# Patient Record
Sex: FEMALE | Race: WHITE
[De-identification: names, ages, dates, MRNs, and addresses within clinical notes are randomized per-mention and may not be internally consistent; named-entity substitution may affect disease eponyms.]

---

## 2021-07-26 ENCOUNTER — HOSPITAL ENCOUNTER (INPATIENT)
Dept: HOSPITAL 47 - EC | Age: 71
LOS: 2 days | Discharge: HOME | DRG: 310 | End: 2021-07-28
Attending: INTERNAL MEDICINE | Admitting: INTERNAL MEDICINE
Payer: MEDICARE

## 2021-07-26 DIAGNOSIS — Z87.891: ICD-10-CM

## 2021-07-26 DIAGNOSIS — Z90.710: ICD-10-CM

## 2021-07-26 DIAGNOSIS — I48.3: ICD-10-CM

## 2021-07-26 DIAGNOSIS — I48.91: Primary | ICD-10-CM

## 2021-07-26 DIAGNOSIS — G89.29: ICD-10-CM

## 2021-07-26 DIAGNOSIS — Z79.01: ICD-10-CM

## 2021-07-26 DIAGNOSIS — Z82.5: ICD-10-CM

## 2021-07-26 DIAGNOSIS — Z79.890: ICD-10-CM

## 2021-07-26 DIAGNOSIS — I49.1: ICD-10-CM

## 2021-07-26 DIAGNOSIS — E03.9: ICD-10-CM

## 2021-07-26 DIAGNOSIS — Z80.1: ICD-10-CM

## 2021-07-26 DIAGNOSIS — Z79.899: ICD-10-CM

## 2021-07-26 DIAGNOSIS — Z82.49: ICD-10-CM

## 2021-07-26 DIAGNOSIS — I34.0: ICD-10-CM

## 2021-07-26 LAB
ALBUMIN SERPL-MCNC: 4.6 G/DL (ref 3.5–5)
ALP SERPL-CCNC: 120 U/L (ref 38–126)
ALT SERPL-CCNC: 18 U/L (ref 4–34)
ANION GAP SERPL CALC-SCNC: 7 MMOL/L
APTT BLD: 22.8 SEC (ref 22–30)
AST SERPL-CCNC: 27 U/L (ref 14–36)
BASOPHILS # BLD AUTO: 0.1 K/UL (ref 0–0.2)
BASOPHILS NFR BLD AUTO: 1 %
BUN SERPL-SCNC: 15 MG/DL (ref 7–17)
CALCIUM SPEC-MCNC: 9.8 MG/DL (ref 8.4–10.2)
CHLORIDE SERPL-SCNC: 110 MMOL/L (ref 98–107)
CO2 SERPL-SCNC: 26 MMOL/L (ref 22–30)
EOSINOPHIL # BLD AUTO: 0.2 K/UL (ref 0–0.7)
EOSINOPHIL NFR BLD AUTO: 2 %
ERYTHROCYTE [DISTWIDTH] IN BLOOD BY AUTOMATED COUNT: 5.08 M/UL (ref 3.8–5.4)
ERYTHROCYTE [DISTWIDTH] IN BLOOD: 14.3 % (ref 11.5–15.5)
GLUCOSE SERPL-MCNC: 107 MG/DL (ref 74–99)
HCT VFR BLD AUTO: 47 % (ref 34–46)
HGB BLD-MCNC: 15.2 GM/DL (ref 11.4–16)
INR PPP: 1 (ref ?–1.2)
LYMPHOCYTES # SPEC AUTO: 2.5 K/UL (ref 1–4.8)
LYMPHOCYTES NFR SPEC AUTO: 28 %
MAGNESIUM SPEC-SCNC: 2 MG/DL (ref 1.6–2.3)
MCH RBC QN AUTO: 30 PG (ref 25–35)
MCHC RBC AUTO-ENTMCNC: 32.4 G/DL (ref 31–37)
MCV RBC AUTO: 92.5 FL (ref 80–100)
MONOCYTES # BLD AUTO: 0.3 K/UL (ref 0–1)
MONOCYTES NFR BLD AUTO: 4 %
NEUTROPHILS # BLD AUTO: 5.6 K/UL (ref 1.3–7.7)
NEUTROPHILS NFR BLD AUTO: 64 %
PLATELET # BLD AUTO: 352 K/UL (ref 150–450)
POTASSIUM SERPL-SCNC: 4.2 MMOL/L (ref 3.5–5.1)
PROT SERPL-MCNC: 7.6 G/DL (ref 6.3–8.2)
PT BLD: 10.3 SEC (ref 9–12)
SODIUM SERPL-SCNC: 143 MMOL/L (ref 137–145)
T4 FREE SERPL-MCNC: 1.84 NG/DL (ref 0.78–2.19)
WBC # BLD AUTO: 8.7 K/UL (ref 3.8–10.6)

## 2021-07-26 PROCEDURE — 80048 BASIC METABOLIC PNL TOTAL CA: CPT

## 2021-07-26 PROCEDURE — 80053 COMPREHEN METABOLIC PANEL: CPT

## 2021-07-26 PROCEDURE — 71046 X-RAY EXAM CHEST 2 VIEWS: CPT

## 2021-07-26 PROCEDURE — 85027 COMPLETE CBC AUTOMATED: CPT

## 2021-07-26 PROCEDURE — 85730 THROMBOPLASTIN TIME PARTIAL: CPT

## 2021-07-26 PROCEDURE — 84443 ASSAY THYROID STIM HORMONE: CPT

## 2021-07-26 PROCEDURE — 84484 ASSAY OF TROPONIN QUANT: CPT

## 2021-07-26 PROCEDURE — 83735 ASSAY OF MAGNESIUM: CPT

## 2021-07-26 PROCEDURE — 85025 COMPLETE CBC W/AUTO DIFF WBC: CPT

## 2021-07-26 PROCEDURE — 93005 ELECTROCARDIOGRAM TRACING: CPT

## 2021-07-26 PROCEDURE — 96374 THER/PROPH/DIAG INJ IV PUSH: CPT

## 2021-07-26 PROCEDURE — 99291 CRITICAL CARE FIRST HOUR: CPT

## 2021-07-26 PROCEDURE — 85610 PROTHROMBIN TIME: CPT

## 2021-07-26 PROCEDURE — 84439 ASSAY OF FREE THYROXINE: CPT

## 2021-07-26 PROCEDURE — 36415 COLL VENOUS BLD VENIPUNCTURE: CPT

## 2021-07-26 PROCEDURE — 96375 TX/PRO/DX INJ NEW DRUG ADDON: CPT

## 2021-07-26 PROCEDURE — 93306 TTE W/DOPPLER COMPLETE: CPT

## 2021-07-26 RX ADMIN — DILTIAZEM HYDROCHLORIDE SCH MLS/HR: 5 INJECTION INTRAVENOUS at 20:59

## 2021-07-26 RX ADMIN — DILTIAZEM HYDROCHLORIDE SCH MLS/HR: 5 INJECTION INTRAVENOUS at 13:50

## 2021-07-26 RX ADMIN — HEPARIN SODIUM SCH MLS/HR: 10000 INJECTION, SOLUTION INTRAVENOUS at 09:58

## 2021-07-26 NOTE — P.HPIM
<Titus Siddiqi - Last Filed: 07/26/21 14:38>





History of Present Illness


H&P Date: 07/26/21





History of Presenting Illness:





Patient is a 70-year-old female with past medical history of hypothyroidism 

presented to the emergency department with a chief complaint of palpitations.  

Patient seen and fully evaluated at bedside in the emergency department and 

reports that she began experiencing intermittent episodes of palpitations 

accompanied by a tightness in her throat and dizziness on and off over the past 

few days.  Patient reports upon awakening today these palpitations were s

ignificantly worsened so she went to the urgent care center and was instructed 

to go straight to the emergency department.  She denies having any recent 

changes and her Synthroid dosage and denies having any other complaints 

including recent illness or exposure to known L contact, headache, changes in 

vision or hearing, changes in or difficulties with speech, chest pain, shortness

of breath, dyspnea with exertion, abdominal pain, nausea, vomiting, or 

experiencing any changes in her difficulties with urinary or bowel function, or 

experiencing any numbness/tingling/weakness/swelling in her extremities.  An EKG

was completed revealing atrial fibrillation with a rapid ventricular response at

157 bpm.  Labs were drawn with CBC, Coags, CMP, and troponin all unremarkable 

showing no abnormalities.  Patient was started on Cardizem infusion for rate 

control a along with anticoagulation for heparin admitted under our services 

with consultation to cardiology.  





Review of systems:





Pertinent positives and negatives as discussed in HPI, a complete review of 

systems was performed and all other systems are negative.





Physical exam:





Vital signs reviewed and stable. 


General: Nontoxic, no distress and appears stated age.  


Derm: Skin warm and dry, normal coloration for ethnicity.


Head: Atraumatic, normocephalic and symmetric.  


Eyes: EOMs intact, no lid lag, and anicteric sclera


Mouth: no lip lesions, mucus membranes moist


Cardiovascular: regular rate and rhythm with normal S1S2, no murmur, positive 

posterior tibial pulses bilaterally, and cap refill < 2 seconds.  


Lungs: Respirations even, regular, and unlabored on room air. Lungs CTA 

bilaterally, no rhonchi, no rales, no wheezing, and no accessory muscle usage. 


Abdominal: soft, nontender to palpation, no guarding, no appreciable 

organomegaly


Ext: ROM intact. No gross muscle atrophy, no edema, no contractures


Neuro: Speech clear, face symmetrical and CN II-XII grossly intact with no noted

focal neuro deficits


Psych: Alert and oriented to person, place, time, and situation. Appropriate and

pleasant affect. 





Assessment and Plan of Care:





Atrial fibrillation with RVR


-EKG was completed revealing atrial fibrillation with a rapid ventricular 

response at 157 bpm.


-Troponin<0.012, we will trend every 3 hours 2.


-Anticoagulation with heparin infusion, pharmacy to dose.  [id number] score 1.


-Continuation of Cardizem infusion titrating to maintain ventricular rate less 

than 110 bpm.


-Metoprolol 25 mg twice a day.


-Telemetry monitoring.


-Consult to cardiology, appreciate recommendations.


-Obtain TSH


-Echocardiogram.





Hypothyroidism


-Hold Synthroid pending TSH results





The patient is admitted with an anticipated greater than 2 midnight stay for 

evaluation of new onset atrial fibrillation with RVR


CODE STATUS: Full code


DVT prophylaxis: Heparin


Discussed with: Patient, patient has been, and RN


Anticipated discharge date: Clinical course to determine


Anticipated discharge place: Home 


A total of 45 minutes was spent on the care of this complex patient more than 

50% of the time was spent in counseling and care coordination.





Past Medical History


Past Medical History: Thyroid Disorder


History of Any Multi-Drug Resistant Organisms: None Reported


Past Surgical History: Hysterectomy, Orthopedic Surgery


Additional Past Surgical History / Comment(s): hip surgery


Past Psychological History: No Psychological Hx Reported


Smoking Status: Former smoker


Past Alcohol Use History: Occasional


Past Drug Use History: None Reported





Medications and Allergies


                                Home Medications











 Medication  Instructions  Recorded  Confirmed  Type


 


Levothyroxine Sodium 88 mcg PO DAILY 07/26/21 07/26/21 History








                                    Allergies











Allergy/AdvReac Type Severity Reaction Status Date / Time


 


No Known Allergies Allergy   Verified 07/26/21 09:28














Physical Exam


Vitals: 


                                   Vital Signs











  Temp Pulse Resp BP Pulse Ox


 


 07/26/21 12:09   115 H  18  140/75  99


 


 07/26/21 09:49   158 H  20  173/117  99


 


 07/26/21 09:24  98.4 F  169 H  16  160/92  97








                                Intake and Output











 07/25/21 07/26/21 07/26/21





 22:59 06:59 14:59


 


Intake Total   8.5


 


Balance   8.5


 


Intake:   


 


  Intake, IV Titration   8.5





  Amount   


 


    Diltiazem 125 mg In   8.5





    Sodium Chloride 0.9% 100   





    ml @ 5 MG/HR 5 mls/hr IV   





    .[facility] Rx#:[id number]   


 


Other:   


 


  Weight   79.379 kg














Results


CBC & Chem 7: 


                                 07/26/21 09:44





                                 07/26/21 09:44


Labs: 


                  Abnormal Lab Results - Last 24 Hours (Table)











  07/26/21 07/26/21 Range/Units





  09:44 09:44 


 


Hct   47.0 H  (34.0-46.0)  %


 


Chloride  110 H   ()  mmol/L


 


Glucose  107 H   (74-99)  mg/dL














<Evelyn Villaseñor - Last Filed: 07/26/21 19:43>





History of Present Illness


Patient seen and examined independently.  Patient was also seen by Titus Siddiqi NP and case was discussed.  I am in agreement with subjective, physical exam, 

assessment and plan as written above and amended below.





Excoriation can still feel her palpitations.  On assessment her heart rate was 

still in the 160s.  Nursing will increase Cardizem drip to 15.  Ativan oral dose

of metoprolol 25 mg.  Patient reports that she has had low heart rates after 

taking a blood pressure medication in the past.  We'll monitor closely on 

telemetry.





General: non toxic, no distress, appears at stated age


Derm: warm, dry


Head: atraumatic, normocephalic, symmetric


Eyes: EOMI, no lid lag, anicteric sclera


Mouth: no lip lesion, mucus membranes moist


Cardiovascular: [S1S2 irreg, no murmur, positive posterior tibial pulse 

bilateral, 


Lungs: CTA bilateral, no rhonchi, no rales , no accessory muscle use








Past Medical History





- Past Family History


  ** Father


Family Medical History: Cancer


Additional Family Medical History / Comment(s): lung cancer, ETOH





  ** Mother


Family Medical History: COPD





  ** Sister(s)


Family Medical History: Cancer, Hypertension





Physical Exam


Osteopathic Statement: *.  No significant issues noted on an osteopathic 

structural exam other than those noted in the History and Physical/Consult.


Vitals: 


                                   Vital Signs











  Temp Pulse Pulse Resp BP BP Pulse Ox


 


 07/26/21 15:45  98.2 F   70  16   158/72  98


 


 07/26/21 13:54   100   18  103/78   96


 


 07/26/21 12:09   115 H   18  140/75   99


 


 07/26/21 09:49   158 H   20  173/117   99


 


 07/26/21 09:24  98.4 F  169 H   16  160/92   97








                                Intake and Output











 07/26/21 07/26/21 07/26/21





 06:59 14:59 22:59


 


Intake Total  29.667 540


 


Balance  29.667 540


 


Intake:   


 


  Intake, IV Titration  29.667 





  Amount   


 


    Diltiazem 125 mg In  29.667 





    Sodium Chloride 0.9% 100   





    ml @ 5 MG/HR 5 mls/hr IV   





    .[facility]24[facility] Rx#:[id number]   


 


  Oral   540


 


Other:   


 


  # Voids   1


 


  Weight  79.379 kg 79.379 kg














Results


CBC & Chem 7: 


                                 07/26/21 09:44





                                 07/26/21 09:44


Labs: 


                  Abnormal Lab Results - Last 24 Hours (Table)











  07/26/21 07/26/21 07/26/21 Range/Units





  09:44 09:44 09:44 


 


Hct   47.0 H   (34.0-46.0)  %


 


APTT     (22.0-30.0)  sec


 


Chloride  110 H    ()  mmol/L


 


Glucose  107 H    (74-99)  mg/dL


 


TSH    0.221 L  (0.465-4.680)  mIU/L














  07/26/21 Range/Units





  17:31 


 


Hct   (34.0-46.0)  %


 


APTT  44.7 H  (22.0-30.0)  sec


 


Chloride   ()  mmol/L


 


Glucose   (74-99)  mg/dL


 


TSH   (0.465-4.680)  mIU/L

## 2021-07-26 NOTE — XR
EXAMINATION TYPE: XR chest 2V

 

DATE OF EXAM: 7/26/2021

 

COMPARISON: None

 

HISTORY: 70-year-old female elevated heart rate, dysrhythmia

 

TECHNIQUE:  PA and lateral views

 

FINDINGS:  

The cardiomediastinal silhouette, aorta, and pulmonary vasculature are within normal limits. Hazy low
er lung opacities related to overlying soft tissue. Otherwise, lungs and pleural spaces are clear.

 

 

IMPRESSION:  

No acute cardiopulmonary process.

## 2021-07-26 NOTE — ED
Arrhythmia/Palpitations HPI





- General


Source: patient, RN notes reviewed


Mode of arrival: wheelchair


Limitations: no limitations





<Adryan Flower - Last Filed: 07/26/21 11:34>





<Orlando Baxter - Last Filed: 07/26/21 13:21>





- General


Chief Complaint: Arrhythmia/Palpitations


Stated Complaint: palpitations


Time Seen by Provider: 07/26/21 09:33





- History of Present Illness


Initial Comments: 





This a 70-year-old female presents emergency from chief complaint of 

palpitations.  Patient states that she has not felt well the last few days.  

Patient went to [facility] and told she needs go the emergency department.  

Patient states she has no chest pains time she occasionally has some shortness 

of breath.  Patient states that she has no prior cardiac disease denies any 

blood thinners denies any leg pain leg swelling.  Patient does have a history of

hypothyroidism.  Patient's had no medication changes recently has been taking 

her doses. (Adryan Flower)





- Related Data


                                Home Medications











 Medication  Instructions  Recorded  Confirmed


 


Levothyroxine Sodium 88 mcg PO DAILY 07/26/21 07/26/21











                                    Allergies











Allergy/AdvReac Type Severity Reaction Status Date / Time


 


No Known Allergies Allergy   Verified 07/26/21 09:28














Review of Systems


ROS Other: All systems not noted in ROS Statement are negative.





<Adryan Flower - Last Filed: 07/26/21 11:34>


ROS Other: All systems not noted in ROS Statement are negative.





<Orlando Baxter - Last Filed: 07/26/21 13:21>


ROS Statement: 


Those systems with pertinent positive or pertinent negative responses have been 

documented in the HPI.








Past Medical History


Past Medical History: Thyroid Disorder


History of Any Multi-Drug Resistant Organisms: None Reported


Past Surgical History: Hysterectomy, Orthopedic Surgery


Additional Past Surgical History / Comment(s): hip surgery


Past Psychological History: No Psychological Hx Reported


Smoking Status: Former smoker


Past Alcohol Use History: Occasional


Past Drug Use History: None Reported





<Adryan Flower - Last Filed: 07/26/21 11:34>





General Exam


Limitations: no limitations


General appearance: alert, in no apparent distress


Head exam: Present: atraumatic, normocephalic, normal inspection


Eye exam: Present: normal appearance, PERRL, EOMI.  Absent: scleral icterus, 

conjunctival injection, periorbital swelling


ENT exam: Present: normal exam, mucous membranes moist


Neck exam: Present: normal inspection.  Absent: tenderness, meningismus, 

lymphadenopathy


Respiratory exam: Present: normal lung sounds bilaterally.  Absent: respiratory 

distress, wheezes, rales, rhonchi, stridor


Cardiovascular Exam: Present: tachycardia, irregular rhythm, normal heart 

sounds.  Absent: regular rate, normal rhythm, systolic murmur, diastolic murmur,

rubs, gallop, clicks


GI/Abdominal exam: Present: soft, normal bowel sounds.  Absent: distended, 

tenderness, guarding, rebound, rigid





<Adryan Flower - Last Filed: 07/26/21 11:34>





Course





<Orlando Baxter - Last Filed: 07/26/21 13:21>


                                   Vital Signs











  07/26/21 07/26/21 07/26/21





  09:24 09:49 12:09


 


Temperature 98.4 F  


 


Pulse Rate 169 H 158 H 115 H


 


Respiratory 16 20 18





Rate   


 


Blood Pressure 160/92 173/117 140/75


 


O2 Sat by Pulse 97 99 99





Oximetry   














- Reevaluation(s)


Reevaluation #1: 





07/26/21 13:20


I did personally evaluate the patient and care I do agree with assessment and 

plan patient comes in with findings consistent with A. flutter


 RVR.  He admitted


07/26/21 13:21


 (Orlando Baxter)





EKG Findings





- EKG Comments:


EKG Findings:: :30 for atrial flutter with 2-1 rate of 157 QRS 82 QT/QTc 

to 232/375





<Adryan Flower - Last Filed: 07/26/21 11:34>





Medical Decision Making





- Lab Data


Result diagrams: 


                                 07/26/21 09:44





                                 07/26/21 09:44





<Adryan Flower - Last Filed: 07/26/21 11:34>





- Lab Data


Result diagrams: 


                                 07/26/21 09:44





                                 07/26/21 09:44





<Orlando Baxter - Last Filed: 07/26/21 13:21>





- Medical Decision Making





70-year-old female presented for palpitations.  Patient found to be in a 

flutter, A. fib.  Patient has no history.  Patient started on Cardizem, heparin.

 Patient lives otherwise unremarkable be admitted for cardiology evaluation. 

(Adryan Flower)





- Lab Data


                                   Lab Results











  07/26/21 07/26/21 07/26/21 Range/Units





  09:44 09:44 09:44 


 


WBC     (3.8-10.6)  k/uL


 


RBC     (3.80-5.40)  m/uL


 


Hgb     (11.4-16.0)  gm/dL


 


Hct     (34.0-46.0)  %


 


MCV     (80.0-100.0)  fL


 


MCH     (25.0-35.0)  pg


 


MCHC     (31.0-37.0)  g/dL


 


RDW     (11.5-15.5)  %


 


Plt Count     (150-450)  k/uL


 


MPV     


 


Neutrophils %     %


 


Lymphocytes %     %


 


Monocytes %     %


 


Eosinophils %     %


 


Basophils %     %


 


Neutrophils #     (1.3-7.7)  k/uL


 


Lymphocytes #     (1.0-4.8)  k/uL


 


Monocytes #     (0-1.0)  k/uL


 


Eosinophils #     (0-0.7)  k/uL


 


Basophils #     (0-0.2)  k/uL


 


PT  10.3    (9.0-12.0)  sec


 


INR  1.0    (<1.2)  


 


APTT  22.8    (22.0-30.0)  sec


 


Sodium   143   (137-145)  mmol/L


 


Potassium   4.2   (3.5-5.1)  mmol/L


 


Chloride   110 H   ()  mmol/L


 


Carbon Dioxide   26   (22-30)  mmol/L


 


Anion Gap   7   mmol/L


 


BUN   15   (7-17)  mg/dL


 


Creatinine   0.86   (0.52-1.04)  mg/dL


 


Est GFR (CKD-EPI)AfAm   80   (>60 ml/min/1.73 sqM)  


 


Est GFR (CKD-EPI)NonAf   70   (>60 ml/min/1.73 sqM)  


 


Glucose   107 H   (74-99)  mg/dL


 


Calcium   9.8   (8.4-10.2)  mg/dL


 


Magnesium   2.0   (1.6-2.3)  mg/dL


 


Total Bilirubin   0.5   (0.2-1.3)  mg/dL


 


AST   27   (14-36)  U/L


 


ALT   18   (4-34)  U/L


 


Alkaline Phosphatase   120   ()  U/L


 


Troponin I    <0.012  (0.000-0.034)  ng/mL


 


Total Protein   7.6   (6.3-8.2)  g/dL


 


Albumin   4.6   (3.5-5.0)  g/dL














  07/26/21 Range/Units





  09:44 


 


WBC  8.7  (3.8-10.6)  k/uL


 


RBC  5.08  (3.80-5.40)  m/uL


 


Hgb  15.2  (11.4-16.0)  gm/dL


 


Hct  47.0 H  (34.0-46.0)  %


 


MCV  92.5  (80.0-100.0)  fL


 


MCH  30.0  (25.0-35.0)  pg


 


MCHC  32.4  (31.0-37.0)  g/dL


 


RDW  14.3  (11.5-15.5)  %


 


Plt Count  352  (150-450)  k/uL


 


MPV  7.8  


 


Neutrophils %  64  %


 


Lymphocytes %  28  %


 


Monocytes %  4  %


 


Eosinophils %  2  %


 


Basophils %  1  %


 


Neutrophils #  5.6  (1.3-7.7)  k/uL


 


Lymphocytes #  2.5  (1.0-4.8)  k/uL


 


Monocytes #  0.3  (0-1.0)  k/uL


 


Eosinophils #  0.2  (0-0.7)  k/uL


 


Basophils #  0.1  (0-0.2)  k/uL


 


PT   (9.0-12.0)  sec


 


INR   (<1.2)  


 


APTT   (22.0-30.0)  sec


 


Sodium   (137-145)  mmol/L


 


Potassium   (3.5-5.1)  mmol/L


 


Chloride   ()  mmol/L


 


Carbon Dioxide   (22-30)  mmol/L


 


Anion Gap   mmol/L


 


BUN   (7-17)  mg/dL


 


Creatinine   (0.52-1.04)  mg/dL


 


Est GFR (CKD-EPI)AfAm   (>60 ml/min/1.73 sqM)  


 


Est GFR (CKD-EPI)NonAf   (>60 ml/min/1.73 sqM)  


 


Glucose   (74-99)  mg/dL


 


Calcium   (8.4-10.2)  mg/dL


 


Magnesium   (1.6-2.3)  mg/dL


 


Total Bilirubin   (0.2-1.3)  mg/dL


 


AST   (14-36)  U/L


 


ALT   (4-34)  U/L


 


Alkaline Phosphatase   ()  U/L


 


Troponin I   (0.000-0.034)  ng/mL


 


Total Protein   (6.3-8.2)  g/dL


 


Albumin   (3.5-5.0)  g/dL














Critical Care Time


Critical Care Time: Yes


Total Critical Care Time: 35





<Adryan Flower - Last Filed: 07/26/21 11:34>





Disposition





<Adryan Flower - Last Filed: 07/26/21 11:34>





<Orlando Baxter - Last Filed: 07/26/21 13:21>


Clinical Impression: 


 Atrial flutter with rapid ventricular response





Disposition: ADMITTED AS IP TO THIS HOSP


Condition: Fair

## 2021-07-27 VITALS — RESPIRATION RATE: 16 BRPM

## 2021-07-27 RX ADMIN — FLECAINIDE ACETATE SCH MG: 50 TABLET ORAL at 10:45

## 2021-07-27 RX ADMIN — DILTIAZEM HYDROCHLORIDE SCH: 5 INJECTION INTRAVENOUS at 08:23

## 2021-07-27 RX ADMIN — METOPROLOL TARTRATE SCH MG: 50 TABLET, FILM COATED ORAL at 08:42

## 2021-07-27 RX ADMIN — APIXABAN SCH MG: 5 TABLET, FILM COATED ORAL at 20:38

## 2021-07-27 RX ADMIN — METOPROLOL TARTRATE SCH MG: 50 TABLET, FILM COATED ORAL at 20:38

## 2021-07-27 RX ADMIN — HEPARIN SODIUM SCH MLS/HR: 10000 INJECTION, SOLUTION INTRAVENOUS at 08:52

## 2021-07-27 RX ADMIN — APIXABAN SCH MG: 5 TABLET, FILM COATED ORAL at 11:53

## 2021-07-27 NOTE — ECHOF
Referral Reason:New-onset atrial fibrillation with RVR



MEASUREMENTS

--------

HEIGHT: 160.0 cm

WEIGHT: 78.9 kg

BP: 127/73

RVIDd:   2.6 cm     (< 3.3)

IVSd:   1.1 cm     (0.6 - 1.1)

LVIDd:   3.4 cm     (3.9 - 5.3)

LVPWd:   1.3 cm     (0.6 - 1.1)

IVSs:   2.1 cm

LVIDs:   1.9 cm

LVPWs:   2.1 cm

LAESV Index (A-L):   28.41 ml/m

Ao Diam:   2.6 cm     (2.0 - 3.7)

AV Cusp:   1.8 cm     (1.5 - 2.6)

LA Diam:   3.1 cm     (2.7 - 3.8)

MV EXCURSION:   19.783 mm     (> 18.000)

MV EF SLOPE:   165 mm/s     (70 - 150)

EPSS:   0.9 cm

AR PHT:   341 ms

RAP:   5.00 mmHg

RVSP:   20.46 mmHg







FINDINGS

--------

Atrial fibrillation.

This was a technically good study.

The left ventricular size is normal.   There is mild concentric left ventricular hypertrophy.   Overa
ll left ventricular systolic function is normal with, an EF between 55 - 60 %.

The right ventricle is normal in size.

The left atrial size is normal.    Normal LA  size by volume 22+/-6 ml/m2.

The right atrial size is normal.

The aortic valve is trileaflet and appears structurally normal.   Trace amount of aortic regurgitatio
n.

The mitral valve is normal.   Mild mitral regurgitation is present.

The tricuspid valve appears structurally normal.   Trace tricuspid regurgitation present.   Right dulce
tricular systolic pressure is normal at < 35 mmHg.

There is no pulmonic regurgitation present.

The aortic root size is normal.

Normal inferior vena cava with normal inspiratory collapse consistent with estimated right atrial pre
ssure of  5 mmHg.

There is no pericardial effusion.



CONCLUSIONS

--------

1. The left ventricular size is normal.

2. There is mild concentric left ventricular hypertrophy.

3. Overall left ventricular systolic function is normal with, an EF between 55 - 60 %.

4. Trace amount of aortic regurgitation.

5. Mild mitral regurgitation is present.

6. Trace tricuspid regurgitation present.

7. There is no pericardial effusion.





SONOGRAPHER: Susana Lobo RDCS

## 2021-07-27 NOTE — P.PN
<Titus Siddiqi - Last Filed: 07/27/21 11:03>





Subjective


Progress Note Date: 07/27/21





History of Presenting Illness:





Patient is a 70-year-old female with past medical history of hypothyroidism 

presented to the emergency department with a chief complaint of palpitations.  

Patient seen and fully evaluated at bedside in the emergency department and 

reports that she began experiencing intermittent episodes of palpitations 

accompanied by a tightness in her throat and dizziness on and off over the past 

few days.  Patient reports upon awakening today these palpitations were signifi

cantly worsened so she went to the urgent care center and was instructed to go 

straight to the emergency department.  She denies having any recent changes and 

her Synthroid dosage and denies having any other complaints including recent 

illness or exposure to known L contact, headache, changes in vision or hearing, 

changes in or difficulties with speech, chest pain, shortness of breath, dyspnea

with exertion, abdominal pain, nausea, vomiting, or experiencing any changes in 

her difficulties with urinary or bowel function, or experiencing any 

numbness/tingling/weakness/swelling in her extremities.  An EKG was completed 

revealing atrial fibrillation with a rapid ventricular response at 157 bpm.  

Labs were drawn with CBC, Coags, CMP, and troponin all unremarkable showing no 

abnormalities.  Patient was started on Cardizem infusion for rate control a 

along with anticoagulation for heparin admitted under our services with 

consultation to cardiology.  Cardizem infusion was discontinued and pt started 

on Flecainide by cardiology. 





Physical exam:





Pt seen and fully evaluated at the bedside this morning.  She reports she 

continues to have palpitations and episodes of lightheadedness.  She denies 

having any headache, changes in vision, changes in hearing, changes in or 

difficulties with her speech, dysphasia, chest pain, shortness of breath, or exp

eriencing any numbness/tingling/weakness/swelling in her extremities.  Patient 

remains in A. fib with rate fluctuating between 90s and 110s at time of 

assessment.  TSH was low at 0.221 however free T4 was normal at 1.84.  She 

currently remains on anticoagulation with heparin infusion, Eliquis prescription

has been sent to pharmacy to verify insurance coverage and patient will be 

changed over to oral anticoagulation once insurance verification on which oral 

anticoagulant is covered can be completed.  Cardizem infusion discontinued and 

patient started on flecainide and Toprol dose increased to 50 mg twice daily.





Vital signs reviewed and stable. 


General: Nontoxic, no distress and appears stated age.  


Derm: Skin warm and dry, normal coloration for ethnicity.


Head: Atraumatic, normocephalic and symmetric.  


Eyes: EOMs intact, no lid lag, and anicteric sclera


Mouth: no lip lesions, mucus membranes moist


Cardiovascular: IRREGULARLY IRREGULAR, no murmur, positive posterior tibial 

pulses bilaterally, and cap refill < 2 seconds.  


Lungs: Respirations even, regular, and unlabored on room air. Lungs CTA 

bilaterally, no rhonchi, no rales, no wheezing, and no accessory muscle usage. 


Abdominal: soft, nontender to palpation, no guarding, no appreciable 

organomegaly


Ext: ROM intact. No gross muscle atrophy, no edema, no contractures


Neuro: Speech clear, face symmetrical and CN II-XII grossly intact with no noted

focal neuro deficits


Psych: Alert and oriented to person, place, time, and situation. Appropriate and

pleasant affect. 





Assessment and Plan of Care:





Atrial fibrillation with RVR


-EKG was completed revealing atrial fibrillation with a rapid ventricular 

response at 157 bpm.


-Troponin<0.012, we will trend every 3 hours 2.


-Anticoagulation with heparin infusion, pharmacy to dose.  [id number] score 1.


-Cardizem infusion was discontinued and pt started on Flecainide by cardiology. 

Currently pt remains in a-fib with rate at time of assessment fluctuating 

between 90's-110's. 


-Metoprolol increased to 50 mg twice a day.


-Telemetry monitoring.


-Cardiology following, appreciate further recommendations.


-TSH slightly low at 0.221, however free T4 was normal at 1.84.  Synthroid dose 

decreased to 50 g daily.


-Echocardiogram.





Hypothyroidism


-TSH slightly low at 0.221, however free T4 was normal at 1.84.  


-Synthroid dose decreased to 50 g daily.


-Patient to be educated upon discharge that she will need to follow up with her 

PCP/endocrinologist for repeat testing of thyroid function in approximately 6-8 

weeks.








CODE STATUS: Full code


DVT prophylaxis: Heparin


Discussed with: Patient, patient has been, and RN


Anticipated discharge date: Clinical course to determine


Anticipated discharge place: Home 


A total of 45 minutes was spent on the care of this complex patient more than 

50% of the time was spent in counseling and care coordination.














Objective





- Vital Signs


Vital signs: 


                                   Vital Signs











Temp  98.2 F   07/27/21 08:00


 


Pulse  80   07/27/21 08:00


 


Resp  16   07/27/21 08:00


 


BP  134/77   07/27/21 08:00


 


Pulse Ox  96   07/27/21 08:00








                                 Intake & Output











 07/26/21 07/27/21 07/27/21





 18:59 06:59 18:59


 


Intake Total 569.667 107.25 399.081


 


Balance 569.667 107.25 399.081


 


Weight 79.379 kg 79.2 kg 


 


Intake:   


 


  Intake, IV Titration 29.667 107.25 399.081





  Amount   


 


    Diltiazem 125 mg In  107.25 85.625





    Sodium Chloride 0.9% 100   





    ml @ 15 MG/HR 15 mls/hr   





    IV .Q8H20M [facility] Rx#:   





    [id number]   


 


    Diltiazem 125 mg In 29.667  95.333





    Sodium Chloride 0.9% 100   





    ml @ 5 MG/HR 5 mls/hr IV   





    .Q24H [facility] Rx#:[id number]   


 


    Heparin Sod,Pork in 0.45%   218.123





    NaCl 25,000 unit In 0.45   





    % NaCl 1 250ml.bag @ 12   





    UNITS/KG/HR 9.525 mls/hr   





    IV .Q24H [facility] Rx#:   





    [id number]   


 


  Oral 540  


 


Other:   


 


  Voiding Method   Toilet


 


  # Voids 1 1 














- Labs


CBC & Chem 7: 


                                 07/26/21 09:44





                                 07/26/21 09:44


Labs: 


                  Abnormal Lab Results - Last 24 Hours (Table)











  07/26/21 07/26/21 07/26/21 Range/Units





  09:44 09:44 09:44 


 


Hct   47.0 H   (34.0-46.0)  %


 


APTT     (22.0-30.0)  sec


 


Chloride  110 H    ()  mmol/L


 


Glucose  107 H    (74-99)  mg/dL


 


TSH    0.221 L  (0.465-4.680)  mIU/L














  07/26/21 Range/Units





  17:31 


 


Hct   (34.0-46.0)  %


 


APTT  44.7 H  (22.0-30.0)  sec


 


Chloride   ()  mmol/L


 


Glucose   (74-99)  mg/dL


 


TSH   (0.465-4.680)  mIU/L














<Evelyn Villaseñor A - Last Filed: 07/27/21 19:40>





Subjective


Titus Siddiqi NP rendered care for this patient independently, reviewed the f

indings and plan as documented in the note above.  I did not physically speak 

with or examine the patient on this date. 








Objective





- Vital Signs


Vital signs: 


                                   Vital Signs











Temp  98 F   07/27/21 16:00


 


Pulse  67   07/27/21 12:00


 


Resp  16   07/27/21 16:00


 


BP  140/64   07/27/21 16:00


 


Pulse Ox  98   07/27/21 16:00








                                 Intake & Output











 07/27/21 07/27/21 07/28/21





 06:59 18:59 06:59


 


Intake Total 107.25 667.021 


 


Balance 107.25 667.021 


 


Weight 79.2 kg  


 


Intake:   


 


  Intake, IV Titration 107.25 427.021 





  Amount   


 


    Diltiazem 125 mg In  95.333 





    Sodium Chloride 0.9% 100   





    ml @ 5 MG/HR 5 mls/hr IV   





    .Q24H [facility] Rx#:[id number]   


 


    Diltiazem 125 mg In 107.25 85.625 





    Sodium Chloride 0.9% 100   





    ml @ 5 MG/HR 5 mls/hr IV   





    .Q24H [facility] Rx#:[id number]   


 


    Heparin Sod,Pork in 0.45%  246.063 





    NaCl 25,000 unit In 0.45   





    % NaCl 1 250ml.bag @ 12   





    UNITS/KG/HR 9.525 mls/hr   





    IV .Q24H [facility] Rx#:   





    [id number]   


 


  Oral  240 


 


Other:   


 


  Voiding Method  Toilet 


 


  # Voids 1 2 














- Labs


CBC & Chem 7: 


                                 07/26/21 09:44





                                 07/26/21 09:44

## 2021-07-27 NOTE — P.CRDCN
History of Present Illness


History of present illness: 


HISTORY OF PRESENTING ILLNESS


This is a pleasant 70-year-old female past medical history significant for 

hypothyroidism. She states she has seen a cardiologist before in [place]. 

She is from [place] and is here on vacation for 2 weeks.  We have been 

asked to see in consultation for new onset atrial flutter. Patient presented to 

the emergency department with a chief complaint of palpitations. She states that

on Friday she started to not "feel well". She denies specific nausea, vomiting, 

abdominal pain. Since Friday she has been experiencing worsening intermittent 

episodes of palpitations, tightness in throat, lightheadness over the past few 

days. Yesterday, her palpitations were significantly worsened so she first 

presented to an urgent care  and was instructed to go to the emergency 

department.  She denies having any recent changes and her Synthroid dosage. She 

denies chest pain, shortness of breath, recent illness or exposure, never had 

covid-19 infection, dyspnea on exertion. She does state she has chronic 

abdominal pain, some nausea and diarrhea and has worked up as an outpatient 

before and currently takes Metamucil and Immodium. She states in the past year 

she has seen a cardiologist in [place], she has been told she has 

occasional extra beats, she also had an echocardiogram and was told it was 

normal and that she has mild mitral regurgitation, she has had stress tests in 

the past which she was told were normal. She denies history of hypertension, 

diabetes, stroke, MI, hyperlipidemia. She denies family history of heart 

disease. She occasionally drinks alcohol about 3 glasses a wine a week and 

denies tobacco use. EKG on admission revealed atrial flutter 2:1 conduction HR 

150s. Patient received 125 mg Cardizem bolus and started on a Cardizem drip at 

15mg/hr weaned to 7.5mg/hr this morning. Patient converted back to sinus rhythm 

with premature atrial complexes currently HR 60-90s. Current home medications 

include synthroid 88mcg. Chest xray no acute cardiopulmonary process Laboratory 

reviewed, CBC unremarkable, sodium 143, potassium 4.2, BUN 15, serum creatinine 

0.8, troponin negative 1, TSH 0.2, free T4 1 0.8





REVIEW OF SYSTEMS


At the time of my exam:


CONSTITUTIONAL: Denies fever or chills.


CARDIOVASCULAR: +palpitations, Denies chest pain, shortness of breath, 

orthopnea, PND 


RESPIRATORY: Denies cough. 


GASTROINTESTINAL: Denies abdominal pain, diarrhea, constipation, nausea or 

vomiting.


MUSCULOSKELETAL: Denies myalgias.


NEUROLOGIC: +dizziness, +lightheadedess. Denies numbness, tingling, headacbe or 

weakness.


ENDOCRINE: Denies fatigue, weight change,  polydipsia or polyurina.


GENITOURINARY: Denies burning, hematuria or urgency with micturation.


HEMATOLOGIC: Denies history of anemia or bleeding. 





PHYSICAL EXAMINATION


CONSTITUTIONAL: No apparent distress. 


HEENT: Head is normocephalic. Pupils are equal, round. Sclerae anicteric. Mucous

membranes of the mouth are moist.  No JVD. No carotid bruit.


CHEST EXAMINATION: Lungs are clear to auscultation. No chest wall tenderness is 

noted on palpation or with deep breathing. 


HEART EXAMINATION: Regular rate and rhythm. S1, S2 heard. No murmurs, gallops or

rub.


ABDOMEN: Soft, nontender. Positive bowel sounds.


EXTREMITIES: 2+ peripheral pulses, no lower extremity edema and no calf 

tenderness.


SKIN: intact


NEUROLOGIC EXAMINATION: Patient is awake, alert and oriented x3. 





ASSESSMENT


Typical Atrial Flutter, new onset -[id number] score 2


Hypothyroidism





PLAN


-Obtain 2D echocardiogram- which revealed EF 55-60%, mild mitral regurgitation


-Repeat EKG this morning completed and reviewed 


-For Rate Control metoprolol tartrate 50mg BID and start flecainide 50mg BID 


-Anticoagulation start Eliquis 5mg BID, Case management consulted, patient does 

have a free month of Eliquis. Case management unable to price Eliquis due to 

insurance at this time, patient is calling in regards to coverage. 


-From a cardiology perspective, if patient feeling well and stable, ok to 

discharge later today. Patient will be in town for about 1 more week. Recommend 

follow up with Dr. Mane prior to going back to [place]. Patient states 

she does have a cardiologist in [place] that she can follow up with. 





Nurse Practitioner note has been reviewed, I agree with a documented findings 

and plan of care.  Patient was seen and examined.











Past Medical History


Past Medical History: Thyroid Disorder


Additional Past Medical History / Comment(s): mitral valve regurgitation


History of Any Multi-Drug Resistant Organisms: None Reported


Past Surgical History: Hysterectomy, Orthopedic Surgery


Additional Past Surgical History / Comment(s): hip surgery


Past Anesthesia/Blood Transfusion Reactions: Previous Problems w/ Anesthesia


Additional Past Anesthesia/Blood Transfusion Reaction / Comment(s): hard to wake

up


Past Psychological History: No Psychological Hx Reported


Smoking Status: Never smoker


Past Alcohol Use History: Occasional


Past Drug Use History: None Reported





- Past Family History


  ** Father


Family Medical History: Cancer


Additional Family Medical History / Comment(s): lung cancer, ETOH





  ** Mother


Family Medical History: COPD





  ** Sister(s)


Family Medical History: Cancer, Hypertension





Medications and Allergies


                                Home Medications











 Medication  Instructions  Recorded  Confirmed  Type


 


Apixaban [Eliquis] 5 mg PO BID 90 Days #180 tab 07/27/21  Rx


 


Flecainide [Tambocor] 50 mg PO Q12HR 90 Days #180 tab 07/27/21  Rx


 


Levothyroxine Sodium [Synthroid] 50 mcg PO DAILY@0630 60 Days #30 07/27/21  Rx





 tab   


 


Metoprolol Tartrate [Lopressor] 50 mg PO BID 90 Days #180 tab 07/27/21  Rx








                                    Allergies











Allergy/AdvReac Type Severity Reaction Status Date / Time


 


No Known Allergies Allergy   Verified 07/26/21 09:28














Physical Exam


Vitals: 


                                   Vital Signs











  Temp Pulse Pulse Resp BP BP Pulse Ox


 


 07/27/21 04:00    92  16   127/73  98


 


 07/27/21 00:00  97.9 F   69  18   121/75  98


 


 07/26/21 20:00  98.2 F   96  16   145/86  98


 


 07/26/21 15:45  98.2 F   70  16   158/72  98


 


 07/26/21 13:54   100   18  103/78   96


 


 07/26/21 12:09   115 H   18  140/75   99


 


 07/26/21 09:49   158 H   20  173/117   99


 


 07/26/21 09:24  98.4 F  169 H   16  160/92   97








                                Intake and Output











 07/26/21 07/26/21 07/27/21





 14:59 22:59 06:59


 


Intake Total 29.667 647.25 


 


Balance 29.667 647.25 


 


Intake:   


 


  Intake, IV Titration 29.667 107.25 





  Amount   


 


    Diltiazem 125 mg In  107.25 





    Sodium Chloride 0.9% 100   





    ml @ 15 MG/HR 15 mls/hr   





    IV .Q8H20M [facility] Rx#:   





    [id number]   


 


    Diltiazem 125 mg In 29.667  





    Sodium Chloride 0.9% 100   





    ml @ 5 MG/HR 5 mls/hr IV   





    .Q24H [facility] Rx#:[id number]   


 


  Oral  540 


 


Other:   


 


  # Voids  1 1


 


  Weight 79.379 kg 79.379 kg 79.2 kg














Results





                                 07/26/21 09:44





                                 07/26/21 09:44


                                 Cardiac Enzymes











  07/26/21 07/26/21 Range/Units





  09:44 09:44 


 


AST  27   (14-36)  U/L


 


Troponin I   <0.012  (0.000-0.034)  ng/mL








                                   Coagulation











  07/26/21 07/26/21 Range/Units





  09:44 17:31 


 


PT  10.3   (9.0-12.0)  sec


 


APTT  22.8  44.7 H  (22.0-30.0)  sec








                                       CBC











  07/26/21 Range/Units





  09:44 


 


WBC  8.7  (3.8-10.6)  k/uL


 


RBC  5.08  (3.80-5.40)  m/uL


 


Hgb  15.2  (11.4-16.0)  gm/dL


 


Hct  47.0 H  (34.0-46.0)  %


 


Plt Count  352  (150-450)  k/uL








                          Comprehensive Metabolic Panel











  07/26/21 Range/Units





  09:44 


 


Sodium  143  (137-145)  mmol/L


 


Potassium  4.2  (3.5-5.1)  mmol/L


 


Chloride  110 H  ()  mmol/L


 


Carbon Dioxide  26  (22-30)  mmol/L


 


BUN  15  (7-17)  mg/dL


 


Creatinine  0.86  (0.52-1.04)  mg/dL


 


Glucose  107 H  (74-99)  mg/dL


 


Calcium  9.8  (8.4-10.2)  mg/dL


 


AST  27  (14-36)  U/L


 


ALT  18  (4-34)  U/L


 


Alkaline Phosphatase  120  ()  U/L


 


Total Protein  7.6  (6.3-8.2)  g/dL


 


Albumin  4.6  (3.5-5.0)  g/dL








                               Current Medications











Generic Name Dose Route Start Last Admin





  Trade Name Freq  PRN Reason Stop Dose Admin


 


Acetaminophen  650 mg  07/26/21 11:35  07/26/21 17:43





  Acetaminophen Tab 325 Mg Tab  PO   650 mg





  Q6HR PRN   Administration





  Mild Pain or Fever > 100.5  


 


Hydrocodone Bitart/Acetaminophen  1 each  07/26/21 14:41 





  Hydrocodone/Apap 5-325mg 1 Each Tab  PO  





  Q4HR PRN  





  Moderate Pain  


 


Heparin Sodium (Porcine)  0 unit  07/26/21 09:40 





  Heparin Sodium 1,000 Un/Ml (10ml Vl)  IV  





  PER PROTOCOL PRN  





  Low PTT  





  Protocol  


 


Heparin Sodium/Sodium Chloride  250 mls @ 9.525 mls/hr  07/26/21 09:45  07/26/21

 09:58





  25,000 unit/ Sodium Chloride  IV   12 units/kg/hr





  .Q24H [facility]   9.525 mls/hr





    Administration





  Protocol  





  12 UNITS/KG/HR  


 


Diltiazem HCl 125 mg/ Sodium  125 mls @ 15 mls/hr  07/26/21 14:00  07/26/21 

20:59





  Chloride  IV   7.5 mg/hr





  .Q8H20M [facility]   7.5 mls/hr





    Administration





  15 MG/HR  


 


Metoprolol Tartrate  25 mg  07/26/21 21:00  07/26/21 20:54





  Metoprolol Tartrate 25 Mg Tab  PO   25 mg





  BID [facility]   Administration


 


Naloxone HCl  0.2 mg  07/26/21 14:41 





  Naloxone 0.4 Mg/Ml 1 Ml Vial  IV  





  Q2M PRN  





  Opioid Reversal  


 


Ondansetron HCl  4 mg  07/26/21 18:42 





  Ondansetron 4 Mg/2 Ml Vial  IVP  





  Q6H PRN  





  Nausea  








                                Intake and Output











 07/26/21 07/26/21 07/27/21





 14:59 22:59 06:59


 


Intake Total 29.667 647.25 


 


Balance 29.667 647.25 


 


Intake:   


 


  Intake, IV Titration 29.667 107.25 





  Amount   


 


    Diltiazem 125 mg In  107.25 





    Sodium Chloride 0.9% 100   





    ml @ 15 MG/HR 15 mls/hr   





    IV .Q8H20M [facility] Rx#:   





    [id number]   


 


    Diltiazem 125 mg In 29.667  





    Sodium Chloride 0.9% 100   





    ml @ 5 MG/HR 5 mls/hr IV   





    .Q24H [facility] Rx#:[id number]   


 


  Oral  540 


 


Other:   


 


  # Voids  1 1


 


  Weight 79.379 kg 79.379 kg 79.2 kg








                                 Patient Weight











 07/27/21





 06:59


 


Weight 79.2 kg








                                        





                                 07/26/21 09:44 





                                 07/26/21 09:44

## 2021-07-28 VITALS — SYSTOLIC BLOOD PRESSURE: 144 MMHG | DIASTOLIC BLOOD PRESSURE: 68 MMHG | HEART RATE: 60 BPM

## 2021-07-28 VITALS — TEMPERATURE: 97.6 F

## 2021-07-28 LAB
ANION GAP SERPL CALC-SCNC: 8 MMOL/L
BUN SERPL-SCNC: 23 MG/DL (ref 7–17)
CALCIUM SPEC-MCNC: 9 MG/DL (ref 8.4–10.2)
CHLORIDE SERPL-SCNC: 108 MMOL/L (ref 98–107)
CO2 SERPL-SCNC: 24 MMOL/L (ref 22–30)
ERYTHROCYTE [DISTWIDTH] IN BLOOD BY AUTOMATED COUNT: 4.29 M/UL (ref 3.8–5.4)
ERYTHROCYTE [DISTWIDTH] IN BLOOD: 14 % (ref 11.5–15.5)
GLUCOSE SERPL-MCNC: 86 MG/DL (ref 74–99)
HCT VFR BLD AUTO: 39.6 % (ref 34–46)
HGB BLD-MCNC: 12.9 GM/DL (ref 11.4–16)
MAGNESIUM SPEC-SCNC: 2 MG/DL (ref 1.6–2.3)
MCH RBC QN AUTO: 30.1 PG (ref 25–35)
MCHC RBC AUTO-ENTMCNC: 32.6 G/DL (ref 31–37)
MCV RBC AUTO: 92.5 FL (ref 80–100)
PLATELET # BLD AUTO: 278 K/UL (ref 150–450)
POTASSIUM SERPL-SCNC: 4.4 MMOL/L (ref 3.5–5.1)
SODIUM SERPL-SCNC: 140 MMOL/L (ref 137–145)
WBC # BLD AUTO: 7.8 K/UL (ref 3.8–10.6)

## 2021-07-28 RX ADMIN — METOPROLOL TARTRATE SCH MG: 50 TABLET, FILM COATED ORAL at 08:34

## 2021-07-28 RX ADMIN — APIXABAN SCH MG: 5 TABLET, FILM COATED ORAL at 08:34

## 2021-07-28 RX ADMIN — FLECAINIDE ACETATE SCH: 50 TABLET ORAL at 06:18

## 2021-07-28 RX ADMIN — FLECAINIDE ACETATE SCH MG: 50 TABLET ORAL at 08:34

## 2021-07-28 NOTE — P.PN
Subjective


This is a pleasant 70-year-old female past medical history significant for 

hypothyroidism. She states she has seen a cardiologist before in [place]. 

She is from [place] and is here on vacation for 2 weeks.  We have been 

asked to see in consultation for new onset atrial flutter. Patient presented to 

the emergency department with a chief complaint of palpitations since Friday. 

EKG on admission revealed atrial flutter 2:1 conduction HR 150s. Patient 

received 125 mg Cardizem bolus and started on a Cardizem drip at 15mg/hr weaned 

to 7.5mg/hr this morning. Patient converted back to sinus rhythm with premature 

atrial complexes currently HR 60-90s. Current home medications include synthroid

88mcg. Chest xray no acute cardiopulmonary process Laboratory reviewed, CBC 

unremarkable, sodium 143, potassium 4.2, BUN 15, serum creatinine 0.8, troponin 

negative 1, TSH 0.2, free T4 1 0.8.





7/28/2021:


Patient seen and examined at bedside, no acute distress. She did state she had 

one episode of lightheadedness while sitting at the bedside, this resolved 

quickly.  Yesterday and overnight with no complaints.  She is able to ambulate 

with no complaints.  She denies chest pain, lightheadedness, dizziness, 

palpitations, presyncope.  Echocardiogram revealed EF 55-60%, mild mitral 

regurgitation.  Repeat EKG yesterday reviewed and Flecainide was started. 

Telemetry reviewed patient in sinus rhythm heart rate 50-60.  Blood pressure 

124/58, afebrile, maintaining saturations on room air.  She is currently 

maintained on Eliquis 5 mg twice a day, flecainide 50 mg twice a day, metoprolol

titrate 50 mg twice a day. 





PHYSICAL EXAMINATION


CONSTITUTIONAL: No apparent distress. 


HEENT: Head is normocephalic. Neck Supple No JVD. 


CHEST EXAMINATION: Lungs are clear to auscultation. No chest wall tenderness is 

noted on palpation or with deep breathing. 


HEART EXAMINATION: Regular rate and rhythm. S1, S2 heard. No murmurs, gallops or

rub.


ABDOMEN: Soft, nontender. Positive bowel sounds.


EXTREMITIES: 2+ peripheral pulses, no lower extremity edema and no calf 

tenderness.


NEUROLOGIC EXAMINATION: Patient is awake, alert and oriented x3. 





ASSESSMENT


Typical Atrial Flutter, new onset -[id number] score 2


Hypothyroidism





PLAN


-For Rate Control metoprolol tartrate 50mg BID and start flecainide 50mg BID 


-Anticoagulation continue Eliquis 5mg BID, Case management consulted, patient 

does have a free month of Eliquis. Patient states she spoke with her insurance 

and has a copay around $120 per month. She states she is able to afford this 

medication. Patient given printed prescription for Eliquis and all other 

medication sent to her pharmacy.


-From a cardiology perspective, patient is stable to be discharged home. Patient

will be in town for about 1 more week. Recommend follow up with Dr. Mane 

prior to going back to [place]. Patient states she does have a cardiologist

in [place] that she can follow up with. 





Nurse Practitioner note has been reviewed, I agree with a documented findings 

and plan of care.  Patient was seen and examined.











Objective





- Vital Signs


Vital signs: 


                                   Vital Signs











Temp  97.6 F   07/28/21 07:57


 


Pulse  68   07/28/21 08:00


 


Resp  16   07/28/21 08:00


 


BP  153/80   07/28/21 07:57


 


Pulse Ox  98   07/28/21 07:57








                                 Intake & Output











 07/27/21 07/28/21 07/28/21





 18:59 06:59 18:59


 


Intake Total 667.021  540


 


Balance 667.021  540


 


Intake:   


 


  Intake, IV Titration 427.021  





  Amount   


 


    Diltiazem 125 mg In 95.333  





    Sodium Chloride 0.9% 100   





    ml @ 5 MG/HR 5 mls/hr IV   





    .Q24H [facility] Rx#:[id number]   


 


    Diltiazem 125 mg In 85.625  





    Sodium Chloride 0.9% 100   





    ml @ 5 MG/HR 5 mls/hr IV   





    .Q24H [facility] Rx#:[id number]   


 


    Heparin Sod,Pork in 0.45% 246.063  





    NaCl 25,000 unit In 0.45   





    % NaCl 1 250ml.bag @ 12   





    UNITS/KG/HR 9.525 mls/hr   





    IV .Q24H [facility] Rx#:   





    [id number]   


 


  Oral 240  540


 


Other:   


 


  Voiding Method Toilet  Toilet


 


  # Voids 2 1 














- Labs


CBC & Chem 7: 


                                 07/28/21 06:11





                                 07/28/21 06:11


Labs: 


                  Abnormal Lab Results - Last 24 Hours (Table)











  07/28/21 Range/Units





  06:11 


 


Chloride  108 H  ()  mmol/L


 


BUN  23 H  (7-17)  mg/dL

## 2021-07-28 NOTE — P.DS
Providers


Date of admission: 


07/26/21 11:34





Expected date of discharge: 07/28/21


Attending physician: 


Evelyn Villaseñor DO





Consults: 





                                        





07/26/21 11:36


Consult Physician Urgent 


   Consulting Provider: Andrew Montana


   Consult Reason/Comments: Atrial flutter


   Do you want consulting provider notified?: Yes











Primary care physician: 


Physician Nonstaff





Hospital Course: 





Atrial fibrillation with RVR


Hypothyroidism








Patient is a 70-year-old female with past medical history of hypothyroidism 

presented to the emergency department with a chief complaint of palpitations.  

Patient seen and fully evaluated at bedside in the emergency department and 

reports that she began experiencing intermittent episodes of palpitations 

accompanied by a tightness in her throat and dizziness on and off over the past 

few days. An EKG was completed revealing atrial fibrillation with a rapid 

ventricular response at 157 bpm.  Labs were drawn with CBC, Coags, CMP, and trop

onin all unremarkable showing no abnormalities.  Patient was started on Cardizem

infusion for rate control a along with anticoagulation for heparin admitted 

under our services with consultation to cardiology.  Cardizem infusion was 

discontinued and pt started on Flecainide by cardiology as well as metoprolol 

dose adjusted.  Pt had stable HRs in NSR on day of discharge with intermittent 

PACs.  No further symptomatic episodes.  She will f/u with cardiology for 

further medication titration.  Notably, she also had low TSH with normal FT4 in 

setting of synthroid tx for hypothyroidism; Synthroid decreased to 50mcg.





I spent 32 minutes coordinating this discharge.


Assessment: 


Gen: awake, alert


HEENT: normocephalic, atraumatic, good hearing acuity, moist mucous membranes


Resp: good air exchange, breathing comfortably with no accessory muscle use, 

CTAB


CVS: good distal perfusion x 4, RRR, no murmurs


GI: soft, NTTP, ND


: no SPT, no CVAT, amin catheter not present


MSK: no pitting edema, no clubbing


Neuro: non-focal, moving all extremities


Psych: cooperative, euthymic mood


Patient Condition at Discharge: Good





Plan - Discharge Summary


Discharge Rx Participant: Yes


New Discharge Prescriptions: 


New


   Levothyroxine Sodium [Synthroid] 50 mcg PO DAILY@0630 60 Days #30 tab


   Metoprolol Tartrate [Lopressor] 50 mg PO BID 90 Days #180 tab


   Flecainide [Tambocor] 50 mg PO Q12HR 90 Days #180 tab


   Apixaban [Eliquis] 5 mg PO BID 30 Days #60 tab





Discontinued


   Levothyroxine Sodium 88 mcg PO DAILY


Discharge Medication List





Flecainide [Tambocor] 50 mg PO Q12HR 90 Days #180 tab 07/27/21 [Rx]


Levothyroxine Sodium [Synthroid] 50 mcg PO DAILY@0630 60 Days #30 tab 07/27/21 

[Rx]


Metoprolol Tartrate [Lopressor] 50 mg PO BID 90 Days #180 tab 07/27/21 [Rx]


Apixaban [Eliquis] 5 mg PO BID 30 Days #60 tab 07/28/21 [Rx]








Follow up Appointment(s)/Referral(s): 


Hu Mane DO [STAFF PHYSICIAN] - 1 Week


Nonstaff,Physician [Primary Care Provider] - 1-2 days


Patient Instructions/Handouts:  A-fib (Atrial Fibrillation) (ED)


Activity/Diet/Wound Care/Special Instructions: 





Activity:





As tolerated





Diet: 





Heart healthy diet.





Special Instructions: 





You are being discharged home on a blood thinner, Eliquis. This is very 

important to take daily as directed until otherwise advised by your 

cardiologist. 





Being that you are being placed on a blood thinner it is very important to watch

for any signs of bleeding and notify your doctor immediately if you notice any 

bleeding or large bruising. It is also important to remove any trip hazards such

as rugs or loose extension cords from your home to prevent unnecessary 

falls/injuries.  If you do experience a fall or head injury, it is extremely 

important to be evaluated by a medical provider immediately to ensure there is 

no internal bleeding. 





Your thyroid function was a little higher than we would like for it to be, for 

that reason your dose of synthroid was changed and reduced down to 50 mcg daily.

You will need to follow up with your PCP/endocrinologist in 6-8 weeks to have 

repeat testing for your thyroid function to ensure an appropriate dose is 

achieved to provide therapeutic level. 








Discharge Disposition: HOME SELF-CARE

## 2021-08-06 ENCOUNTER — HOSPITAL ENCOUNTER (INPATIENT)
Dept: HOSPITAL 47 - EC | Age: 71
LOS: 2 days | Discharge: HOME | DRG: 287 | End: 2021-08-08
Attending: INTERNAL MEDICINE | Admitting: INTERNAL MEDICINE
Payer: MEDICARE

## 2021-08-06 DIAGNOSIS — Z80.1: ICD-10-CM

## 2021-08-06 DIAGNOSIS — I50.21: Primary | ICD-10-CM

## 2021-08-06 DIAGNOSIS — I42.8: ICD-10-CM

## 2021-08-06 DIAGNOSIS — I34.1: ICD-10-CM

## 2021-08-06 DIAGNOSIS — Z98.890: ICD-10-CM

## 2021-08-06 DIAGNOSIS — Z82.49: ICD-10-CM

## 2021-08-06 DIAGNOSIS — K80.20: ICD-10-CM

## 2021-08-06 DIAGNOSIS — D72.829: ICD-10-CM

## 2021-08-06 DIAGNOSIS — Z90.710: ICD-10-CM

## 2021-08-06 DIAGNOSIS — I27.20: ICD-10-CM

## 2021-08-06 DIAGNOSIS — Z79.01: ICD-10-CM

## 2021-08-06 DIAGNOSIS — E87.6: ICD-10-CM

## 2021-08-06 DIAGNOSIS — E03.9: ICD-10-CM

## 2021-08-06 DIAGNOSIS — Z82.5: ICD-10-CM

## 2021-08-06 DIAGNOSIS — I49.1: ICD-10-CM

## 2021-08-06 DIAGNOSIS — Z79.899: ICD-10-CM

## 2021-08-06 DIAGNOSIS — I48.0: ICD-10-CM

## 2021-08-06 DIAGNOSIS — I48.3: ICD-10-CM

## 2021-08-06 DIAGNOSIS — R77.8: ICD-10-CM

## 2021-08-06 DIAGNOSIS — I34.0: ICD-10-CM

## 2021-08-06 DIAGNOSIS — K76.1: ICD-10-CM

## 2021-08-06 DIAGNOSIS — Z81.1: ICD-10-CM

## 2021-08-06 DIAGNOSIS — Z79.890: ICD-10-CM

## 2021-08-06 LAB
ALBUMIN SERPL-MCNC: 4.4 G/DL (ref 3.5–5)
ALP SERPL-CCNC: 131 U/L (ref 38–126)
ALT SERPL-CCNC: 147 U/L (ref 4–34)
ANION GAP SERPL CALC-SCNC: 8 MMOL/L
APTT BLD: 24 SEC (ref 22–30)
AST SERPL-CCNC: 100 U/L (ref 14–36)
BASOPHILS # BLD AUTO: 0.1 K/UL (ref 0–0.2)
BASOPHILS NFR BLD AUTO: 1 %
BUN SERPL-SCNC: 12 MG/DL (ref 7–17)
CALCIUM SPEC-MCNC: 9.7 MG/DL (ref 8.4–10.2)
CHLORIDE SERPL-SCNC: 108 MMOL/L (ref 98–107)
CO2 SERPL-SCNC: 23 MMOL/L (ref 22–30)
EOSINOPHIL # BLD AUTO: 0.3 K/UL (ref 0–0.7)
EOSINOPHIL NFR BLD AUTO: 3 %
ERYTHROCYTE [DISTWIDTH] IN BLOOD BY AUTOMATED COUNT: 4.65 M/UL (ref 3.8–5.4)
ERYTHROCYTE [DISTWIDTH] IN BLOOD: 14.4 % (ref 11.5–15.5)
GLUCOSE SERPL-MCNC: 123 MG/DL (ref 74–99)
HCT VFR BLD AUTO: 43.2 % (ref 34–46)
HGB BLD-MCNC: 14.2 GM/DL (ref 11.4–16)
INR PPP: 1 (ref ?–1.2)
LYMPHOCYTES # SPEC AUTO: 3 K/UL (ref 1–4.8)
LYMPHOCYTES NFR SPEC AUTO: 27 %
MAGNESIUM SPEC-SCNC: 2.1 MG/DL (ref 1.6–2.3)
MCH RBC QN AUTO: 30.5 PG (ref 25–35)
MCHC RBC AUTO-ENTMCNC: 32.9 G/DL (ref 31–37)
MCV RBC AUTO: 92.9 FL (ref 80–100)
MONOCYTES # BLD AUTO: 0.3 K/UL (ref 0–1)
MONOCYTES NFR BLD AUTO: 3 %
NEUTROPHILS # BLD AUTO: 7.2 K/UL (ref 1.3–7.7)
NEUTROPHILS NFR BLD AUTO: 65 %
PLATELET # BLD AUTO: 327 K/UL (ref 150–450)
POTASSIUM SERPL-SCNC: 4.3 MMOL/L (ref 3.5–5.1)
PROT SERPL-MCNC: 7.3 G/DL (ref 6.3–8.2)
PT BLD: 10.5 SEC (ref 9–12)
SODIUM SERPL-SCNC: 139 MMOL/L (ref 137–145)
WBC # BLD AUTO: 11.1 K/UL (ref 3.8–10.6)

## 2021-08-06 PROCEDURE — 93306 TTE W/DOPPLER COMPLETE: CPT

## 2021-08-06 PROCEDURE — 93325 DOPPLER ECHO COLOR FLOW MAPG: CPT

## 2021-08-06 PROCEDURE — 85730 THROMBOPLASTIN TIME PARTIAL: CPT

## 2021-08-06 PROCEDURE — 84132 ASSAY OF SERUM POTASSIUM: CPT

## 2021-08-06 PROCEDURE — 76705 ECHO EXAM OF ABDOMEN: CPT

## 2021-08-06 PROCEDURE — 93312 ECHO TRANSESOPHAGEAL: CPT

## 2021-08-06 PROCEDURE — 80048 BASIC METABOLIC PNL TOTAL CA: CPT

## 2021-08-06 PROCEDURE — 36415 COLL VENOUS BLD VENIPUNCTURE: CPT

## 2021-08-06 PROCEDURE — 83880 ASSAY OF NATRIURETIC PEPTIDE: CPT

## 2021-08-06 PROCEDURE — 93320 DOPPLER ECHO COMPLETE: CPT

## 2021-08-06 PROCEDURE — 83735 ASSAY OF MAGNESIUM: CPT

## 2021-08-06 PROCEDURE — 83605 ASSAY OF LACTIC ACID: CPT

## 2021-08-06 PROCEDURE — 84484 ASSAY OF TROPONIN QUANT: CPT

## 2021-08-06 PROCEDURE — 85379 FIBRIN DEGRADATION QUANT: CPT

## 2021-08-06 PROCEDURE — 93308 TTE F-UP OR LMTD: CPT

## 2021-08-06 PROCEDURE — 71046 X-RAY EXAM CHEST 2 VIEWS: CPT

## 2021-08-06 PROCEDURE — 93005 ELECTROCARDIOGRAM TRACING: CPT

## 2021-08-06 PROCEDURE — 85025 COMPLETE CBC W/AUTO DIFF WBC: CPT

## 2021-08-06 PROCEDURE — 80053 COMPREHEN METABOLIC PANEL: CPT

## 2021-08-06 PROCEDURE — 93458 L HRT ARTERY/VENTRICLE ANGIO: CPT

## 2021-08-06 PROCEDURE — 85610 PROTHROMBIN TIME: CPT

## 2021-08-06 RX ADMIN — METOPROLOL TARTRATE SCH MG: 25 TABLET, FILM COATED ORAL at 20:20

## 2021-08-06 RX ADMIN — LEVOTHYROXINE SODIUM SCH: 88 TABLET ORAL at 09:01

## 2021-08-06 RX ADMIN — HEPARIN SODIUM SCH MLS/HR: 10000 INJECTION, SOLUTION INTRAVENOUS at 14:01

## 2021-08-06 RX ADMIN — FUROSEMIDE SCH MG: 10 INJECTION, SOLUTION INTRAMUSCULAR; INTRAVENOUS at 20:20

## 2021-08-06 RX ADMIN — FUROSEMIDE SCH MG: 10 INJECTION, SOLUTION INTRAMUSCULAR; INTRAVENOUS at 09:02

## 2021-08-06 RX ADMIN — METOPROLOL TARTRATE SCH MG: 25 TABLET, FILM COATED ORAL at 08:59

## 2021-08-06 NOTE — P.HPIM
History of Present Illness


Patient is a pleasant 70-year-old the female came in with compensative shortness

of breath orthopnea and possible proximal nocturnal dyspnea which started 

yesterday.  Patient also comparing of dry cough.  Patient was complaining of p

ain in between the shoulder blades still has her gallbladder.  Patient was 

recently hospitalized and subsequently was discharged after she was treated for 

atrial fibrillation at the time patient the came in with chest pain at this time

patient denied any chest pain.  Patient is found to be in congestive heart 

failure with pulmonary edema and patient was started on IV Lasix.  Patient is on

metoprolol dose of which has been cut down recently because of side effects.  

Patient is also on Eliquis and flecainide.  Flecainide was discontinued.  Echo 

cardiac exam was obtained while at echocardiogram is being done I was present at

bedside it appears patient may have decreased EF of around 40-45% with possible 

anti-wall motion abnormalities and a preferred cardiology patient may end up 

needing cardiac catheterization.  Because of her shoulder blade pain ultrasound 

of the gallbladder is being obtained as well.











REVIEW OF SYSTEMS: 


CONSTITUTIONAL: No fever, no malaise, no fatigue. 


HEENT: No recent visual problems or hearing problems. Denied any sore throat. 


CARDIOVASCULAR: As mentioned in HPI 


PULMONARY: No shortness of breath, no cough, no hemoptysis. 


GASTROINTESTINAL: No diarrhea, no nausea, no vomiting, no abdominal pain. 


NEUROLOGICAL: No headaches, no weakness, no numbness. 


HEMATOLOGICAL: Denies any bleeding or petechiae. 


GENITOURINARY: Denies any burning micturition, frequency, or urgency. 


MUSCULOSKELETAL/RHEUMATOLOGICAL: Denies any joint pain, swelling, or any muscle 

pain. 


ENDOCRINE: Denies any polyuria or polydipsia. 





The rest of the 14-point review of systems is negative.











PHYSICAL EXAMINATION: 





GENERAL: The patient is alert and oriented x3, not in any acute distress. Well 

developed, well nourished. 


HEENT: Pupils are round and equally reacting to light. EOMI. No scleral icterus.

No conjunctival pallor. Normocephalic, atraumatic. No pharyngeal erythema. No t

hyromegaly. 


CARDIOVASCULAR: S1 and S2 present. No murmurs, rubs, or gallops. 


PULMONARY: Bibasilar crackles no wheezing was appreciated. 


ABDOMEN: Soft, nontender, nondistended, normoactive bowel sounds. No palpable 

organomegaly. 


MUSCULOSKELETAL: No joint swelling or deformity.


EXTREMITIES: No cyanosis, clubbing, patient apparently had pedal edema which 

improved


NEUROLOGICAL: Gross neurological examination did not reveal any focal deficits. 


SKIN: No rashes. 





Assessment and plan


- congestive heart failure new-onset acute systolic dysfunction with acute 

exacerbation patient is on IV Lasix will be continued, patient does have almost 

abnormalities cardiology was notified possibly end up needing a cardiac 

catheterization, continue with metoprolol


-Hypothyroidism


-Shoulder blade pain ultrasound of the gallbladder


-History of atrial fibrillation for which patient is on anticoagulation, which 

is being continued at this time





DVT prophylaxis:








Past Medical History


Past Medical History: Thyroid Disorder


Additional Past Medical History / Comment(s): mitral valve regurgitation


History of Any Multi-Drug Resistant Organisms: None Reported


Past Surgical History: Hysterectomy, Orthopedic Surgery


Additional Past Surgical History / Comment(s): hip surgery


Past Anesthesia/Blood Transfusion Reactions: Previous Problems w/ Anesthesia


Additional Past Anesthesia/Blood Transfusion Reaction / Comment(s): hard to wake

up


Past Psychological History: No Psychological Hx Reported


Smoking Status: Never smoker


Past Alcohol Use History: Occasional


Past Drug Use History: None Reported





- Past Family History


  ** Father


Family Medical History: Cancer


Additional Family Medical History / Comment(s): lung cancer, ETOH





  ** Mother


Family Medical History: COPD





  ** Sister(s)


Family Medical History: Cancer, Hypertension





Medications and Allergies


                                Home Medications











 Medication  Instructions  Recorded  Confirmed  Type


 


Flecainide [Tambocor] 50 mg PO Q12HR 90 Days #180 tab 07/27/21 08/06/21 Rx


 


Apixaban [Eliquis] 5 mg PO BID 30 Days #60 tab 07/28/21 08/06/21 Rx


 


Levothyroxine Sodium [Synthroid] 88 mcg PO DAILY 08/06/21 08/06/21 History


 


Metoprolol Tartrate [Lopressor] 25 mg PO BID 08/06/21 08/06/21 History








                                    Allergies











Allergy/AdvReac Type Severity Reaction Status Date / Time


 


No Known Allergies Allergy   Verified 08/06/21 06:38














Physical Exam


Vitals: 


                                   Vital Signs











  Temp Pulse Resp BP BP Pulse Ox


 


 08/06/21 08:00  98 F   16   141/75  98


 


 08/06/21 07:33   63  18  154/78   96


 


 08/06/21 06:00   57 L  18  142/72   94 L


 


 08/06/21 05:00   52 L  22  138/76   98


 


 08/06/21 04:00   51 L  20  132/73   97


 


 08/06/21 03:45   54 L  20  144/80   96


 


 08/06/21 03:06     150/86  


 


 08/06/21 02:53   61  20  171/97   88 L


 


 08/06/21 02:35   74  16  155/99   94 L


 


 08/06/21 01:58   67  18  166/86   96


 


 08/06/21 00:37  98 F  66  18  196/86   95








                                Intake and Output











 08/05/21 08/06/21 08/06/21





 22:59 06:59 14:59


 


Other:   


 


  Voiding Method   Toilet


 


  # Voids  1 


 


  # Bowel Movements  1 


 


  Weight  79.379 kg 














Results


CBC & Chem 7: 


                                 08/06/21 01:17





                                 08/06/21 01:17


Labs: 


                  Abnormal Lab Results - Last 24 Hours (Table)











  08/06/21 08/06/21 08/06/21 Range/Units





  01:17 01:17 01:17 


 


WBC  11.1 H    (3.8-10.6)  k/uL


 


D-Dimer   0.62 H   (<0.60)  mg/L FEU


 


Chloride    108 H  ()  mmol/L


 


Glucose    123 H  (74-99)  mg/dL


 


AST    100 H  (14-36)  U/L


 


ALT    147 H  (4-34)  U/L


 


Alkaline Phosphatase    131 H  ()  U/L


 


Troponin I     (0.000-0.034)  ng/mL














  08/06/21 08/06/21 Range/Units





  04:48 07:50 


 


WBC    (3.8-10.6)  k/uL


 


D-Dimer    (<0.60)  mg/L FEU


 


Chloride    ()  mmol/L


 


Glucose    (74-99)  mg/dL


 


AST    (14-36)  U/L


 


ALT    (4-34)  U/L


 


Alkaline Phosphatase    ()  U/L


 


Troponin I  0.161 H*  0.271 H*  (0.000-0.034)  ng/mL

## 2021-08-06 NOTE — ECHOF
Referral Reason:new shortness of breath LV function



MEASUREMENTS

--------

HEIGHT: 160.0 cm

WEIGHT: 79.4 kg

BP: 141/75

RAP:   5.00 mmHg

RVSP:   39.12 mmHg







FINDINGS

--------

Sinus rhythm.

Limited Study

Overall left ventricular systolic function is mild-moderately impaired with, an EF between 40 - 45 %.
   Basal anterior LV wall motion is hypokinetic.    Mid anterior LV wall motion is hypokinetic.

There is mild aortic regurgitation.

Mild-to-moderate mitral regurgitation is present.

Mild tricuspid regurgitation present.   There is mild pulmonary hypertension.   The right ventricular
 systolic pressure, as measured by Doppler, is 39.12mmHg.

Trace/mild (physiologic)  pulmonic regurgitation.

There is no pericardial effusion.



CONCLUSIONS

--------

1. Overall left ventricular systolic function is mild-moderately impaired with, an EF between 40 - 45
 %.

2. Basal anterior LV wall motion is hypokinetic.

3. Mid anterior LV wall motion is hypokinetic.

4. There is mild aortic regurgitation.

5. Mild-to-moderate mitral regurgitation is present.

6. Mild tricuspid regurgitation present.

7. There is mild pulmonary hypertension.

8. Trace/mild (physiologic)  pulmonic regurgitation.

9. There is no pericardial effusion.





SONOGRAPHER: Norma Bai RDCS

## 2021-08-06 NOTE — P.CRDCN
History of Present Illness


History of present illness: 


HISTORY OF PRESENTING ILLNESS


This is a pleasant 70-year-old female past medical history significant for 

hypothyroidism, paroxysmal typical atrial flutter. She has followed up once with

Dr. Mane. She states she has seen a cardiologist before in [place]. She

is from [place] and is here on vacation here, suppose to go back home on 

Sunday. We have been asked to see in consultation for pulmonary edema. She 

presents to the emergency department with worsening shortness of breath that 

started Wednesday. She states she hasnt been at 100% yet since her 

hospitalization a few weeks ago. She states 2 days ago she noticed her shortness

of breath when she laid down to go to bed yesterday evening. States that she 

also developed a dry cough. She also endorses some mild dysnea on exertion. She 

denies waking up short of breath. She denies fever, chills, chest pain, nausea, 

diaphoresis, lightheadedness dizziness, palpiations, pre syncope or syncope. She

does have right sided neck/shoulder pain, that feels like a "knot" it is 

improved with massaging. She did get IV morphine and felt nauseous after that. 

She states in the past year she has seen a cardiologist in [place], she has

been told she has occasional extra beats, she also had an echocardiogram and was

told it was normal and that she has mild mitral regurgitation, she has had 

stress tests in the past which she was told were normal. She denies history of 

hypertension, diabetes, stroke, MI, hyperlipidemia. She denies family history of

heart disease. She occasionally drinks alcohol about 3 glasses a wine a week and

denies tobacco use.


Patient started on IV Lasix 40mg BID. She has significant improvement in lower 

extremity edema 





Patient recently presented to the hospital on 07/27/21 with a chief complaint of

palpitations. EKG on admission revealed atrial flutter 2:1 conduction HR 150s. 

Patient received 125 mg Cardizem bolus and started on a Cardizem drip at 15mg/hr

weaned to 7.5mg/hr this morning. Patient converted back to sinus rhythm with 

premature atrial complexes currently HR 60-90s. She was started on Eliquis 5mg 

BID, Flecainide 50mg BID, and metoprolol tartrate 50mg BID. 2D echocardiogram 

was obtained and  revealed EF 55-60%, mild mitral regurgitation. She did follow 

up with Dr. Mane outpatient, at that time patient did not feel 100%, her 

metoprolol was decreased to 25mg BID. 





DIAGNOSTICS:


EKG this morning sinus bradycardia HR 51, non-specific ST-T wave abnormalities


Telemetry reviewed- patient in sinus mechanism HR 45-60s


Chest Xray- new small bilateral pleural effusions and pulmonary interstitial 

edema. 


Laboratory data reviewed- initial troponin negative, 0.16, 0.27, sodium 139, 

potassium 4.6, BUN 12, Synthroid 0.88, , , alkaline phosphatase 

137 WBC 11.1, platelets 327, hemoglobin 14.2





REVIEW OF SYSTEMS


At the time of my exam:


CONSTITUTIONAL: Denies fever or chills.


CARDIOVASCULAR: +shortness of breath +orthopnea Denies chest pain, shortness of 

breath, PND 


RESPIRATORY: +cough. 


GASTROINTESTINAL: Denies abdominal pain, diarrhea, constipation, nausea or vo

miting.


MUSCULOSKELETAL: Denies myalgias.


NEUROLOGIC:  Denies numbness, tingling, headacbe or weakness.


ENDOCRINE: Denies fatigue, weight change,  polydipsia or polyurina.


GENITOURINARY: Denies burning, hematuria or urgency with micturation.


HEMATOLOGIC: Denies history of anemia or bleeding. 





PHYSICAL EXAMINATION


Blood pressure 141/75, heart rate 63, afebrile, maintaining oxygen saturations 

89% on 4 L nasal cannula.


CONSTITUTIONAL: No apparent distress. 


HEENT: Head is normocephalic. Pupils are equal, round. Sclerae anicteric. Mucous

membranes of the mouth are moist.  No JVD. No carotid bruit.


CHEST EXAMINATION: Lungs with bilateral crackles in the bases. No chest wall 

tenderness is noted on palpation or with deep breathing. 


HEART EXAMINATION: Regular rate and rhythm. S1, S2 heard. No murmurs, gallops or

rub.


ABDOMEN: Soft, nontender. Positive bowel sounds.


EXTREMITIES: 2+ peripheral pulses, mild non pitting bilateral lower extremity 

edema and no calf tenderness.


SKIN: intact


NEUROLOGIC EXAMINATION: Patient is awake, alert and oriented x3. 





ASSESSMENT


Shortness of breath 


Cough


Acute systolic congestive heart failure 


Elevated troponin 


Typical Atrial Flutter,recent diagnosis in July [id number] score 2- 

currently maintaining sinus mechanism 


Hypothyroidism


Elevated liver enzymes 





PLAN


Echocardiogram obtained and reviewed, patient with EF 40-45%, moderate MR, basal

anterior wall is hypokinetic. Unclear etiology at this time. We would like to 

rule out ischemic cardiomyopathy and recommend cardiac catheterization at this 

time. 


Plan for cardiac catheterization with Dr. Venegas tomorrow 


I have discussed the risks, benefits and alternative therapies for the above-

mentioned procedure and for both sedation/analgesia as well as necessary blood 

product administration, if indicated, as they pertain to this patient.  The 

patient has indicated understanding and acceptance of the risks and procedures 

discussed.  Questions have been answered appropriately and he is agreeable to 

move forward with the above-stated procedure. 


Patient and  updated on plan. 


Continue IV Lasix 40mg BID, metoprolol tartrate 25mg BID


We will hold Eliquis


Stop Flecainide at this time 


Trend liver enzymes


Monitor renal function and electrolytes 


Further recommendations based on clinical course





Nurse Practitioner note has been reviewed, I agree with a documented findings 

and plan of care.  Patient was seen and examined.











Past Medical History


Past Medical History: Thyroid Disorder


Additional Past Medical History / Comment(s): mitral valve regurgitation


History of Any Multi-Drug Resistant Organisms: None Reported


Past Surgical History: Hysterectomy, Orthopedic Surgery


Additional Past Surgical History / Comment(s): hip surgery


Past Anesthesia/Blood Transfusion Reactions: Previous Problems w/ Anesthesia


Additional Past Anesthesia/Blood Transfusion Reaction / Comment(s): hard to wake

up


Past Psychological History: No Psychological Hx Reported


Smoking Status: Never smoker


Past Alcohol Use History: Occasional


Past Drug Use History: None Reported





- Past Family History


  ** Father


Family Medical History: Cancer


Additional Family Medical History / Comment(s): lung cancer, ETOH





  ** Mother


Family Medical History: COPD





  ** Sister(s)


Family Medical History: Cancer, Hypertension





Medications and Allergies


                                Home Medications











 Medication  Instructions  Recorded  Confirmed  Type


 


Flecainide [Tambocor] 50 mg PO Q12HR 90 Days #180 tab 07/27/21 08/06/21 Rx


 


Apixaban [Eliquis] 5 mg PO BID 30 Days #60 tab 07/28/21 08/06/21 Rx


 


Levothyroxine Sodium [Synthroid] 88 mcg PO DAILY 08/06/21 08/06/21 History


 


Metoprolol Tartrate [Lopressor] 25 mg PO BID 08/06/21 08/06/21 History








                                    Allergies











Allergy/AdvReac Type Severity Reaction Status Date / Time


 


No Known Allergies Allergy   Verified 08/06/21 06:38














Physical Exam


Vitals: 


                                   Vital Signs











  Temp Pulse Resp BP Pulse Ox


 


 08/06/21 07:33   63  18  154/78  96


 


 08/06/21 06:00   57 L  18  142/72  94 L


 


 08/06/21 05:00   52 L  22  138/76  98


 


 08/06/21 04:00   51 L  20  132/73  97


 


 08/06/21 03:45   54 L  20  144/80  96


 


 08/06/21 03:06     150/86 


 


 08/06/21 02:53   61  20  171/97  88 L


 


 08/06/21 02:35   74  16  155/99  94 L


 


 08/06/21 01:58   67  18  166/86  96


 


 08/06/21 00:37  98 F  66  18  196/86  95








                                Intake and Output











 08/05/21 08/06/21 08/06/21





 22:59 06:59 14:59


 


Other:   


 


  # Voids  1 


 


  # Bowel Movements  1 


 


  Weight  79.379 kg 














Results





                                 08/06/21 01:17





                                 08/06/21 01:17


                                 Cardiac Enzymes











  08/06/21 08/06/21 08/06/21 Range/Units





  01:17 01:17 04:48 


 


AST  100 H    (14-36)  U/L


 


Troponin I   <0.012  0.161 H*  (0.000-0.034)  ng/mL








                                   Coagulation











  08/06/21 Range/Units





  01:17 


 


PT  10.5  (9.0-12.0)  sec


 


APTT  24.0  (22.0-30.0)  sec








                                       CBC











  08/06/21 Range/Units





  01:17 


 


WBC  11.1 H  (3.8-10.6)  k/uL


 


RBC  4.65  (3.80-5.40)  m/uL


 


Hgb  14.2  (11.4-16.0)  gm/dL


 


Hct  43.2  (34.0-46.0)  %


 


Plt Count  327  (150-450)  k/uL








                          Comprehensive Metabolic Panel











  08/06/21 Range/Units





  01:17 


 


Sodium  139  (137-145)  mmol/L


 


Potassium  4.3  (3.5-5.1)  mmol/L


 


Chloride  108 H  ()  mmol/L


 


Carbon Dioxide  23  (22-30)  mmol/L


 


BUN  12  (7-17)  mg/dL


 


Creatinine  0.88  (0.52-1.04)  mg/dL


 


Glucose  123 H  (74-99)  mg/dL


 


Calcium  9.7  (8.4-10.2)  mg/dL


 


AST  100 H  (14-36)  U/L


 


ALT  147 H  (4-34)  U/L


 


Alkaline Phosphatase  131 H  ()  U/L


 


Total Protein  7.3  (6.3-8.2)  g/dL


 


Albumin  4.4  (3.5-5.0)  g/dL








                               Current Medications











Generic Name Dose Route Start Last Admin





  Trade Name Freq  PRN Reason Stop Dose Admin


 


Furosemide  40 mg  08/06/21 09:00 





  Furosemide 10 Mg/Ml 4 Ml Vial  IV  





  Q12HR OBEY  


 


Naloxone HCl  0.2 mg  08/06/21 02:32 





  Naloxone 0.4 Mg/Ml 1 Ml Vial  IV  





  Q2M PRN  





  Opioid Reversal  


 


Ondansetron HCl  4 mg  08/06/21 06:32  08/06/21 06:55





  Ondansetron 4 Mg/2 Ml Vial  IVP   4 mg





  Q6HR PRN   Administration





  Nausea And Vomiting  








                                Intake and Output











 08/05/21 08/06/21 08/06/21





 22:59 06:59 14:59


 


Other:   


 


  # Voids  1 


 


  # Bowel Movements  1 


 


  Weight  79.379 kg 








                                        





                                 08/06/21 01:17 





                                 08/06/21 01:17

## 2021-08-06 NOTE — ED
SOB HPI





- General


Chief Complaint: Shortness of Breath


Stated Complaint: DAYO


Time Seen by Provider: 08/06/21 01:02


Source: patient


Mode of arrival: ambulatory





- History of Present Illness


Initial Comments: 


70 year-old female patient presents to the emergency department for evaluation 

of shortness of breath that started when she laid down to go to bed this 

evening.  States that she also developed a dry cough.  States she's been having 

some right shoulder discomfort for the last couple of days.  Denies any chest 

pain, nausea, vomiting, abdominal pain.  States she is having increased swelling

to the lower extremities.  Denies history of heart failure swelling.  Was 

admitted to the hospital 2 weeks ago for "atrial flutter". Was started on 

flecainide.   Patient denies any recent rash, fever, chills, diarrhea, 

constipation, back pain, numbness, tingling, dizziness, weakness, hematuria, 

dysuria, urinary urgency, urinary frequency, headache, visual changes, or any 

other complaints.





- Related Data


                                  Previous Rx's











 Medication  Instructions  Recorded


 


Flecainide [Tambocor] 50 mg PO Q12HR 90 Days #180 tab 07/27/21


 


Levothyroxine Sodium [Synthroid] 50 mcg PO DAILY@0630 60 Days #30 07/27/21





 tab 


 


Metoprolol Tartrate [Lopressor] 50 mg PO BID 90 Days #180 tab 07/27/21


 


Apixaban [Eliquis] 5 mg PO BID 30 Days #60 tab 07/28/21











                                    Allergies











Allergy/AdvReac Type Severity Reaction Status Date / Time


 


No Known Allergies Allergy   Verified 08/06/21 00:40














Review of Systems


ROS Statement: 


Those systems with pertinent positive or pertinent negative responses have been 

documented in the HPI.





ROS Other: All systems not noted in ROS Statement are negative.





Past Medical History


Past Medical History: Thyroid Disorder


Additional Past Medical History / Comment(s): mitral valve regurgitation


History of Any Multi-Drug Resistant Organisms: None Reported


Past Surgical History: Hysterectomy, Orthopedic Surgery


Additional Past Surgical History / Comment(s): hip surgery


Past Anesthesia/Blood Transfusion Reactions: Previous Problems w/ Anesthesia


Additional Past Anesthesia/Blood Transfusion Reaction / Comment(s): hard to wake

 up


Past Psychological History: No Psychological Hx Reported


Smoking Status: Never smoker


Past Alcohol Use History: Occasional


Past Drug Use History: None Reported





- Past Family History


  ** Father


Family Medical History: Cancer


Additional Family Medical History / Comment(s): lung cancer, ETOH





  ** Mother


Family Medical History: COPD





  ** Sister(s)


Family Medical History: Cancer, Hypertension





General Exam


General appearance: alert, in no apparent distress, other (Physical well-

developed, well-nourished adult female patient in no acute distress.  Vital 

signs upon presentation are temperature 98.0F, pulse 66, respirations 18, blood

 pressure 196/86, pulse ox 95% on room air.)


ENT exam: Present: normal exam, normal oropharynx, mucous membranes moist


Respiratory exam: Present: normal lung sounds bilaterally.  Absent: respiratory 

distress, wheezes, rales, rhonchi, stridor


Cardiovascular Exam: Present: regular rate, normal rhythm, normal heart sounds. 

 Absent: systolic murmur, diastolic murmur, rubs, gallop, clicks


GI/Abdominal exam: Present: soft, normal bowel sounds.  Absent: distended, 

tenderness, guarding, rebound, rigid


Extremities exam: Present: full ROM, normal capillary refill, other (2+ pitting 

edema noted to the bilateral lower legs and feet.).  Absent: tenderness, pedal 

edema, joint swelling, calf tenderness


Neurological exam: Present: alert, oriented X3, CN II-XII intact


Psychiatric exam: Present: normal affect, normal mood


Skin exam: Present: warm, dry, intact, normal color.  Absent: rash





Course


                                   Vital Signs











  08/06/21 08/06/21 08/06/21





  00:37 01:58 02:35


 


Temperature 98 F  


 


Pulse Rate 66 67 74


 


Respiratory 18 18 16





Rate   


 


Blood Pressure 196/86 166/86 155/99


 


O2 Sat by Pulse 95 96 94 L





Oximetry   














  08/06/21 08/06/21





  02:53 03:06


 


Temperature  


 


Pulse Rate 61 


 


Respiratory 20 





Rate  


 


Blood Pressure 171/97 150/86


 


O2 Sat by Pulse 88 L 





Oximetry  














Medical Decision Making





- Medical Decision Making


70-year-old female patient presented for evaluation of shortness of breath and 

lower extremity edema.  She also reported some right posterior shoulder pain.  

Labs reviewed and did reveal mildly elevated liver enzymes.  BNP was around 300.

  D-dimer was 0.62 which is not concerning due to her age. Chest xray showed 

evidence of pulmonary edema and interstitial edema. While here patient developed

 acute respiratory distress, BP elevated, Oxygen saturation decreased to 80% on 

4L nasal cannula. She was given IV lasix, nitro sublingual, nitro paste, and put

 on non-rebreather. Bipap was ordered but patient started vomiting. Patient did 

start to feel better. Oxygen saturation improved. She will be admitted for 

pulmonary edema possible heart failure. She is agreeable with this plan. Case 

discussed with my attending Dr. Oleary who was also in to evaluate the patient. 








- Lab Data


Result diagrams: 


                                 08/06/21 01:17





                                 08/06/21 01:17


                                   Lab Results











  08/06/21 08/06/21 08/06/21 Range/Units





  01:17 01:17 01:17 


 


WBC  11.1 H    (3.8-10.6)  k/uL


 


RBC  4.65    (3.80-5.40)  m/uL


 


Hgb  14.2    (11.4-16.0)  gm/dL


 


Hct  43.2    (34.0-46.0)  %


 


MCV  92.9    (80.0-100.0)  fL


 


MCH  30.5    (25.0-35.0)  pg


 


MCHC  32.9    (31.0-37.0)  g/dL


 


RDW  14.4    (11.5-15.5)  %


 


Plt Count  327    (150-450)  k/uL


 


MPV  9.0    


 


Neutrophils %  65    %


 


Lymphocytes %  27    %


 


Monocytes %  3    %


 


Eosinophils %  3    %


 


Basophils %  1    %


 


Neutrophils #  7.2    (1.3-7.7)  k/uL


 


Lymphocytes #  3.0    (1.0-4.8)  k/uL


 


Monocytes #  0.3    (0-1.0)  k/uL


 


Eosinophils #  0.3    (0-0.7)  k/uL


 


Basophils #  0.1    (0-0.2)  k/uL


 


PT   10.5   (9.0-12.0)  sec


 


INR   1.0   (<1.2)  


 


APTT   24.0   (22.0-30.0)  sec


 


D-Dimer   0.62 H   (<0.60)  mg/L FEU


 


Sodium    139  (137-145)  mmol/L


 


Potassium    4.3  (3.5-5.1)  mmol/L


 


Chloride    108 H  ()  mmol/L


 


Carbon Dioxide    23  (22-30)  mmol/L


 


Anion Gap    8  mmol/L


 


BUN    12  (7-17)  mg/dL


 


Creatinine    0.88  (0.52-1.04)  mg/dL


 


Est GFR (CKD-EPI)AfAm    78  (>60 ml/min/1.73 sqM)  


 


Est GFR (CKD-EPI)NonAf    68  (>60 ml/min/1.73 sqM)  


 


Glucose    123 H  (74-99)  mg/dL


 


Plasma Lactic Acid Madan     (0.7-2.0)  mmol/L


 


Calcium    9.7  (8.4-10.2)  mg/dL


 


Magnesium    2.1  (1.6-2.3)  mg/dL


 


Total Bilirubin    0.2  (0.2-1.3)  mg/dL


 


AST    100 H  (14-36)  U/L


 


ALT    147 H  (4-34)  U/L


 


Alkaline Phosphatase    131 H  ()  U/L


 


Troponin I     (0.000-0.034)  ng/mL


 


NT-Pro-B Natriuret Pep     pg/mL


 


Total Protein    7.3  (6.3-8.2)  g/dL


 


Albumin    4.4  (3.5-5.0)  g/dL














  08/06/21 08/06/21 08/06/21 Range/Units





  01:17 01:17 01:17 


 


WBC     (3.8-10.6)  k/uL


 


RBC     (3.80-5.40)  m/uL


 


Hgb     (11.4-16.0)  gm/dL


 


Hct     (34.0-46.0)  %


 


MCV     (80.0-100.0)  fL


 


MCH     (25.0-35.0)  pg


 


MCHC     (31.0-37.0)  g/dL


 


RDW     (11.5-15.5)  %


 


Plt Count     (150-450)  k/uL


 


MPV     


 


Neutrophils %     %


 


Lymphocytes %     %


 


Monocytes %     %


 


Eosinophils %     %


 


Basophils %     %


 


Neutrophils #     (1.3-7.7)  k/uL


 


Lymphocytes #     (1.0-4.8)  k/uL


 


Monocytes #     (0-1.0)  k/uL


 


Eosinophils #     (0-0.7)  k/uL


 


Basophils #     (0-0.2)  k/uL


 


PT     (9.0-12.0)  sec


 


INR     (<1.2)  


 


APTT     (22.0-30.0)  sec


 


D-Dimer     (<0.60)  mg/L FEU


 


Sodium     (137-145)  mmol/L


 


Potassium     (3.5-5.1)  mmol/L


 


Chloride     ()  mmol/L


 


Carbon Dioxide     (22-30)  mmol/L


 


Anion Gap     mmol/L


 


BUN     (7-17)  mg/dL


 


Creatinine     (0.52-1.04)  mg/dL


 


Est GFR (CKD-EPI)AfAm     (>60 ml/min/1.73 sqM)  


 


Est GFR (CKD-EPI)NonAf     (>60 ml/min/1.73 sqM)  


 


Glucose     (74-99)  mg/dL


 


Plasma Lactic Acid Madan  1.1    (0.7-2.0)  mmol/L


 


Calcium     (8.4-10.2)  mg/dL


 


Magnesium     (1.6-2.3)  mg/dL


 


Total Bilirubin     (0.2-1.3)  mg/dL


 


AST     (14-36)  U/L


 


ALT     (4-34)  U/L


 


Alkaline Phosphatase     ()  U/L


 


Troponin I   <0.012   (0.000-0.034)  ng/mL


 


NT-Pro-B Natriuret Pep    388  pg/mL


 


Total Protein     (6.3-8.2)  g/dL


 


Albumin     (3.5-5.0)  g/dL














- EKG Data


-: EKG Interpreted by Me


EKG Comments: 





EKG obtained at 0114 shows normal sinus rhythm with a ventricular rate of 74, [place]

 interval 192, QRS duration 104, , .





- Radiology Data


Radiology results: report reviewed, image reviewed


Two-view x-ray of the chest is obtained.  Report is reviewed in its entirety.  

Impression by Dr. Lancaster shows small pleural effusions and pulmonary 

interstitial edema could be acute congestive heart failure.  This appears new 

compared to old exam.





Disposition


Clinical Impression: 


 Pulmonary edema





Disposition: ADMITTED AS IP TO THIS HOSP


Condition: Serious


Decision to Admit Reason: Admit from EC


Decision Date: 08/06/21


Decision Time: 02:32

## 2021-08-06 NOTE — US
EXAMINATION TYPE: US gallbladder

 

DATE OF EXAM: 8/6/2021

 

COMPARISON: NONE

 

CLINICAL HISTORY: elevated liver enzymes. In hospital for DAYO.

 

EXAM MEASUREMENTS:

 

Liver Length:  13.2 cm   

Gallbladder Wall:  0.2 cm   

CBD:  0.6 cm

Right Kidney:  9.0 x 5.9 x 3.1 cm

 

 

 

Pancreas:  hyperechoic

Liver:  no masses seen  

Gallbladder:  sludge and multiple shadowing stones seen within

**Evidence for sonographic Liu's sign:  no

CBD:  wnl 

Right Kidney:  No hydronephrosis or masses seen 

 

SMall right pleural effusion is noted on images # 5144 and 5120. 

 

IMPRESSION: 

1. Cholelithiasis

## 2021-08-07 LAB
ALBUMIN SERPL-MCNC: 4.3 G/DL (ref 3.5–5)
ALP SERPL-CCNC: 119 U/L (ref 38–126)
ALT SERPL-CCNC: 90 U/L (ref 4–34)
ANION GAP SERPL CALC-SCNC: 7 MMOL/L
APTT BLD: 63.4 SEC (ref 22–30)
AST SERPL-CCNC: 42 U/L (ref 14–36)
BASOPHILS # BLD AUTO: 0.1 K/UL (ref 0–0.2)
BASOPHILS NFR BLD AUTO: 0 %
BUN SERPL-SCNC: 18 MG/DL (ref 7–17)
CALCIUM SPEC-MCNC: 9.5 MG/DL (ref 8.4–10.2)
CHLORIDE SERPL-SCNC: 101 MMOL/L (ref 98–107)
CO2 SERPL-SCNC: 31 MMOL/L (ref 22–30)
EOSINOPHIL # BLD AUTO: 0.1 K/UL (ref 0–0.7)
EOSINOPHIL NFR BLD AUTO: 1 %
ERYTHROCYTE [DISTWIDTH] IN BLOOD BY AUTOMATED COUNT: 4.75 M/UL (ref 3.8–5.4)
ERYTHROCYTE [DISTWIDTH] IN BLOOD: 14.4 % (ref 11.5–15.5)
GLUCOSE SERPL-MCNC: 109 MG/DL (ref 74–99)
HCT VFR BLD AUTO: 44.1 % (ref 34–46)
HGB BLD-MCNC: 14.3 GM/DL (ref 11.4–16)
INR PPP: 1.1 (ref ?–1.2)
LYMPHOCYTES # SPEC AUTO: 2.6 K/UL (ref 1–4.8)
LYMPHOCYTES NFR SPEC AUTO: 21 %
MCH RBC QN AUTO: 30.2 PG (ref 25–35)
MCHC RBC AUTO-ENTMCNC: 32.6 G/DL (ref 31–37)
MCV RBC AUTO: 92.8 FL (ref 80–100)
MONOCYTES # BLD AUTO: 0.5 K/UL (ref 0–1)
MONOCYTES NFR BLD AUTO: 4 %
NEUTROPHILS # BLD AUTO: 9.2 K/UL (ref 1.3–7.7)
NEUTROPHILS NFR BLD AUTO: 74 %
PLATELET # BLD AUTO: 312 K/UL (ref 150–450)
POTASSIUM SERPL-SCNC: 3.6 MMOL/L (ref 3.5–5.1)
PROT SERPL-MCNC: 7.3 G/DL (ref 6.3–8.2)
PT BLD: 11.5 SEC (ref 9–12)
SODIUM SERPL-SCNC: 139 MMOL/L (ref 137–145)
WBC # BLD AUTO: 12.5 K/UL (ref 3.8–10.6)

## 2021-08-07 PROCEDURE — B246ZZ4 ULTRASONOGRAPHY OF RIGHT AND LEFT HEART, TRANSESOPHAGEAL: ICD-10-PCS

## 2021-08-07 PROCEDURE — B2151ZZ FLUOROSCOPY OF LEFT HEART USING LOW OSMOLAR CONTRAST: ICD-10-PCS

## 2021-08-07 PROCEDURE — B2111ZZ FLUOROSCOPY OF MULTIPLE CORONARY ARTERIES USING LOW OSMOLAR CONTRAST: ICD-10-PCS

## 2021-08-07 PROCEDURE — 4A023N7 MEASUREMENT OF CARDIAC SAMPLING AND PRESSURE, LEFT HEART, PERCUTANEOUS APPROACH: ICD-10-PCS

## 2021-08-07 RX ADMIN — CEFAZOLIN SCH MLS/HR: 330 INJECTION, POWDER, FOR SOLUTION INTRAMUSCULAR; INTRAVENOUS at 14:06

## 2021-08-07 RX ADMIN — LEVOTHYROXINE SODIUM SCH MCG: 88 TABLET ORAL at 06:13

## 2021-08-07 RX ADMIN — CEFAZOLIN SCH: 330 INJECTION, POWDER, FOR SOLUTION INTRAMUSCULAR; INTRAVENOUS at 13:44

## 2021-08-07 RX ADMIN — FUROSEMIDE SCH MG: 10 INJECTION, SOLUTION INTRAMUSCULAR; INTRAVENOUS at 08:21

## 2021-08-07 RX ADMIN — HEPARIN SODIUM SCH: 10000 INJECTION, SOLUTION INTRAVENOUS at 10:42

## 2021-08-07 RX ADMIN — METOPROLOL TARTRATE SCH MG: 25 TABLET, FILM COATED ORAL at 20:53

## 2021-08-07 RX ADMIN — METOPROLOL TARTRATE SCH MG: 25 TABLET, FILM COATED ORAL at 09:24

## 2021-08-07 RX ADMIN — FUROSEMIDE SCH MG: 10 INJECTION, SOLUTION INTRAMUSCULAR; INTRAVENOUS at 20:53

## 2021-08-07 RX ADMIN — HEPARIN SODIUM SCH MLS/HR: 10000 INJECTION, SOLUTION INTRAVENOUS at 20:51

## 2021-08-07 NOTE — P.TEE
Indications for Procedure(s): 


Assess mitral regurgitation


Date of Procedure: 08/07/21


Preoperative Diagnosis: 


Moderate mitral regurgitation


Postoperative Diagnosis: 


Moderate mitral regurgitation


Procedure(s) Performed: 


NOEMI


Description of Procedure(s): 





INDICATION: This is a 70-year-old female who was admitted to the hospital with 

the CHF with positive troponin and evidence of moderate mitral regurgitation.  

Cardiac cath showed normal coronary arteries.  Advised to have NOEMI for further 

evaluation 





CONSENT: .  Informed verbal consent is obtained from the patient and family





PROCEDURE: .  Patient was brought to the lab in a fasting state.  She was given 

1 .5 milligrams of Versed and 25 mics of fentanyl.  A lubricated Omni probe was 

introduced in the oropharynx and advanced into the esophagus and multiple views 

were obtained both from the esophagus and stomach.  Color, pulsed and continuous

Doppler studies were performed.  Saline contrast bubble injection is done.  

Patient tolerated the procedure well





FINDINGS: .  The aortic valve is normal.  The mitral valve structurally appear 

to be normal with minimal prolapse.  There is mitral regurgitation which 

appeared to be moderate.The PISA value is about 0.6 to .8.  The mitral 

regurgitation appears to be eccentric, directed laterally.  Left atrium appeared

mildly enlarged.  Left atrial appendage is free of any clot.  No spontaneous 

flow across the interatrial septum.  Saline contrast bubble injection showed 

some few late arrival bubbles.  Left ventricle function appeared to be good.  

The tricuspid valve showed mild regurgitation.  Aorta did not reveal any 

significant plaque





IMPRESSION: Moderate mitral regurgitation with evidence of mitral valve 

thickening and mild prolapse.  Mild tricuspid regurgitation.  No clot in the 

left atrial appendage.  No definite PFO.  The LV function is preserved.  Left 

atrium appeared mildly enlarged





PLAN: Continuation of medical therapy.  Second opinion from surgeons regarding 

mitral regurgitation.

## 2021-08-07 NOTE — P.CARDCATH
Date of Procedure: 08/07/21


Preoperative Diagnosis: 


Congestive heart failure, wall motion abnormality on the echocardiogram.  Mitral

valve regurgitation


Postoperative Diagnosis: 


Normal coronary arteries.  Mitral valve prolapse and moderate mitral 

regurgitation


Procedure(s) Performed: 


Left heart catheterization without left ventriculography


Description of Procedure: 


HISTORY: []





CONSENT:I have discussed the risks, benefits and alternative therapies for the 

above-mentioned procedure and for both sedation/analgesia as well as necessary 

blood product administration, if indicated, as they pertain to this patient.  

The patient has indicated understanding and acceptance of the risks and 

procedures discussed.





[]





PROCEDURE: Patient was brought to the lab in a fasting state.  Patient was given

 some IV sedation.  The right wrist is infiltrated with lidocaine and right 

radial artery was entered using Seldinger technique.  A 6-[place]cu[place] catheter was 

left in place and selective coronary arteriography and left ventriculography was

 performed.  Patient tolerated the procedure well.  TR band was applied for 

hemostasis  No immediate complications were noted and patient was transferred to

 [facility] in a stable condition





Conscious Sedation: Versed 1mg


Fentanyl 25 g


Duration 30minutes   


HEMODYNAMICS: The aortic pressure is 130/70.  Left ventricle end-diastolic 

pressure was 20 before left ventriculography.  He does gone up to 40 after the 

left ventriculography





SELECTIVE CORONARY ARTERIOGRAPHY: 


                          LEFT MAIN: Normal.  Divides immediately into LAD and 

also circumflex


                          THE LEFT ANTERIOR DESCENDING CORONARY ARTERY: .  Good 

caliber vessel giving rise good-sized diagonal branch ,the LAD and branches are 

free of occlusive disease


                          THE LEFT CIRCUMFLEX AND IS CORONARY ARTERY: This is a 

good caliber vessel free of any occlusive disease


                          THE RIGHT CORONARY ARTERY: .  This is a dominant 

vessel giving rise to PDA and PLV.  Free of any occlusive disease





      





LEFT VENTRICULOGRAPHY: .  This revealed normal-sized cardiac silhouette with 

good systolic function.  There is an area of anterobasal segment which appears 

to be hypokinetic.  There is evidence of mitral valve prolapse with moderate 

mitral regurgitation





FINAL IMPRESSION: Normal coronary arteries.  Mitral valve prolapse with moderate

 mitral regurgitation  Elevated end-diastolic pressures size to diastolic 

dysfunction.  Wall motion abnormalities with hypokinesis of the anterobasal 

segment, could be secondary to myocarditis, focal





PLAN: Continued medical therapy.  Further evaluation of the mitral regurgitation

 the NOEMI





PROGNOSIS: Guarded

## 2021-08-07 NOTE — P.PN
Subjective


Progress Note Date: 08/07/21


Patient is a pleasant 70-year-old the female came in with compensative shortness

of breath orthopnea and possible proximal nocturnal dyspnea which started 

yesterday.  Patient also comparing of dry cough.  Patient was complaining of 

pain in between the shoulder blades still has her gallbladder.  Patient was 

recently hospitalized and subsequently was discharged after she was treated for 

atrial fibrillation at the time patient the came in with chest pain at this time

patient denied any chest pain.  Patient is found to be in congestive heart 

failure with pulmonary edema and patient was started on IV Lasix.  Patient is on

metoprolol dose of which has been cut down recently because of side effects.  

Patient is also on Eliquis and flecainide.  Flecainide was discontinued.  Echo 

cardiac exam was obtained while at echocardiogram is being done I was present at

bedside it appears patient may have decreased EF of around 40-45% with possible 

anti-wall motion abnormalities and a preferred cardiology patient may end up 

needing cardiac catheterization.  Because of her shoulder blade pain ultrasound 

of the gallbladder is being obtained as well.





08/07/21


Patient is evaluated at the bedside today status post cardiac catheterization.  

Patient has family members present at the bedside as well.  Patient cardiac 

catheterization was clear at this time.  Patient's recent echo done in July 2021

revealed an ejection fraction of 55-60%.  Patient's most recent echo performed 

in this hospitalization revealed an ejection fraction of 40-45%, 

mild-to-moderate mitral regurgitation is present, mild tricuspid regurgitation, 

mild pulmonary hypertension.  Chest x-ray on admission revealed a small pleural 

effusion and pulmonary interstitial up he edema that could be acute congestive 

heart failure that appears new compared to previous exam.  Patient was evaluated

by cardiology who recommended a cardiac catheterization today.  Patient also had

a NOEMI, performed today as well as a repeat echocardiogram.  NOEMI revealed a 

moderate mitral regurgitation with evidence of mitral valve thickening and mild 

prolapse.  Awaiting repeat echocardiogram results.  Patient will continue IV 

Lasix at this time.  Patient denies any chest pain, shortness of breath, cough 

at this time.  Patient is currently on bed rest status post cardiac cath.  

Patient currently denies any abdominal pain, patient reports that the shoulder 

blade pain has resolved at this time.  Patient will follow-up with general 

surgery outpatient for her gallstones.





ROS:





Constitutional: Denied any fatigue denied any fever.


Cardio vascular: denied any chest pain, palpitations


Gastrointestinal denied any nausea vomiting


Pulmonary: Denied any shortness of breath cough


Neurologic denied any new focal deficits





All inpatient medications were reviewed and appropriate changes in these 

medications as dictated in the interval history and assessment and plan.





PHYSICAL EXAMINATION: 





GENERAL: The patient is alert and oriented x3, not in any acute distress. Well 

developed, well nourished. 


HEENT: Pupils are round and equally reacting to light. EOMI. No scleral icterus.

No conjunctival pallor. Normocephalic, atraumatic. No pharyngeal erythema. No 

thyromegaly. 


CARDIOVASCULAR: S1 and S2 present. No murmurs, rubs, or gallops. 


PULMONARY: mild crackles in the lung bases


ABDOMEN: Soft, nontender, nondistended, normoactive bowel sounds. No palpable 

organomegaly. 


MUSCULOSKELETAL: No joint swelling or deformity.


EXTREMITIES: No cyanosis, clubbing, or pedal edema. 


NEUROLOGICAL: Gross neurological examination did not reveal any focal deficits. 


SKIN: No rashes. 





Assessment and plan


- congestive heart failure new-onset acute systolic dysfunction with acute 

exacerbation, EF 40-45%.  patient is on IV Lasix will be continued, pending 

repeat echocardiogram.


Moderate mitral regurgitation, history of mitral regurgitation, appreciate 

surgical input.


-Hypothyroidism


-Shoulder blade pain ultrasound of the gallbladder revealed cholelisthiasis. 

Follow up gen surg outpatient


-transaminitis - improving, current levels are down to AST 42, ALT 90.  Most 

likely related to hepatic congestion related to acute systolic congestive heart 

failure.


-Leukocytosis, reactive in nature, continue to monitor.








DVT prophylaxis: Early ambulation, 





The plan is for patient to be evaluated by surgical services for a moderate 

mitral valve regurgitation.  Recommendations from cardiology.  Patient will 

continue IV Lasix.  Repeat echo results are pending.  Encourage ambulation.  

Patient will have repeat laboratory values done in the morning.  Patient can 

follow up with general surgery outpatient for a findings of cholelithiasis on ab

dominal ultrasound.  No further workup at this time.  Continue with all the 

current medications.  Patient and family have been updated on the plan of care.











Objective





- Vital Signs


Vital signs: 


                                   Vital Signs











Temp  98 F   08/07/21 14:07


 


Pulse  48 L  08/07/21 14:07


 


Resp  16   08/07/21 14:07


 


BP  123/57   08/07/21 14:07


 


Pulse Ox  98   08/07/21 14:07








                                 Intake & Output











 08/06/21 08/07/21 08/07/21





 18:59 06:59 18:59


 


Intake Total 118 90.011 100


 


Output Total  1050 450


 


Balance 118 -959.989 -350


 


Weight  70 kg 


 


Intake:   


 


  IV   100


 


  Intake, IV Titration  90.011 





  Amount   


 


    Heparin Sod,Pork in 0.45%  90.011 





    NaCl 25,000 unit In 0.45   





    % NaCl 1 250ml.bag @ 12   





    UNITS/KG/HR 9.525 mls/hr   





    IV .Q24H [facility] Rx#:   





    [id number]   


 


  Oral 118  0


 


Output:   


 


  Urine  1050 450


 


Other:   


 


  Voiding Method Toilet Toilet Toilet


 


  # Voids 1  2














- Labs


CBC & Chem 7: 


                                 08/07/21 07:31





                                 08/07/21 07:31


Labs: 


                  Abnormal Lab Results - Last 24 Hours (Table)











  08/06/21 08/07/21 08/07/21 Range/Units





  22:23 07:31 07:31 


 


WBC    12.5 H  (3.8-10.6)  k/uL


 


Neutrophils #    9.2 H  (1.3-7.7)  k/uL


 


APTT  38.4 H    (22.0-30.0)  sec


 


Carbon Dioxide   31 H   (22-30)  mmol/L


 


BUN   18 H   (7-17)  mg/dL


 


Glucose   109 H   (74-99)  mg/dL


 


AST   42 H   (14-36)  U/L


 


ALT   90 H   (4-34)  U/L














  08/07/21 Range/Units





  07:31 


 


WBC   (3.8-10.6)  k/uL


 


Neutrophils #   (1.3-7.7)  k/uL


 


APTT  63.4 H  (22.0-30.0)  sec


 


Carbon Dioxide   (22-30)  mmol/L


 


BUN   (7-17)  mg/dL


 


Glucose   (74-99)  mg/dL


 


AST   (14-36)  U/L


 


ALT   (4-34)  U/L














Assessment and Plan


Time with Patient: Greater than 30

## 2021-08-08 VITALS — TEMPERATURE: 98 F | SYSTOLIC BLOOD PRESSURE: 120 MMHG | HEART RATE: 55 BPM | DIASTOLIC BLOOD PRESSURE: 68 MMHG

## 2021-08-08 VITALS — RESPIRATION RATE: 18 BRPM

## 2021-08-08 LAB
ANION GAP SERPL CALC-SCNC: 8 MMOL/L
BASOPHILS # BLD AUTO: 0.1 K/UL (ref 0–0.2)
BASOPHILS NFR BLD AUTO: 1 %
BUN SERPL-SCNC: 20 MG/DL (ref 7–17)
CALCIUM SPEC-MCNC: 9.3 MG/DL (ref 8.4–10.2)
CHLORIDE SERPL-SCNC: 99 MMOL/L (ref 98–107)
CO2 SERPL-SCNC: 31 MMOL/L (ref 22–30)
EOSINOPHIL # BLD AUTO: 0 K/UL (ref 0–0.7)
EOSINOPHIL NFR BLD AUTO: 0 %
ERYTHROCYTE [DISTWIDTH] IN BLOOD BY AUTOMATED COUNT: 4.81 M/UL (ref 3.8–5.4)
ERYTHROCYTE [DISTWIDTH] IN BLOOD: 14.5 % (ref 11.5–15.5)
GLUCOSE SERPL-MCNC: 105 MG/DL (ref 74–99)
HCT VFR BLD AUTO: 44.7 % (ref 34–46)
HGB BLD-MCNC: 14.3 GM/DL (ref 11.4–16)
LYMPHOCYTES # SPEC AUTO: 2.1 K/UL (ref 1–4.8)
LYMPHOCYTES NFR SPEC AUTO: 20 %
MAGNESIUM SPEC-SCNC: 1.9 MG/DL (ref 1.6–2.3)
MCH RBC QN AUTO: 29.8 PG (ref 25–35)
MCHC RBC AUTO-ENTMCNC: 32.1 G/DL (ref 31–37)
MCV RBC AUTO: 92.9 FL (ref 80–100)
MONOCYTES # BLD AUTO: 0.4 K/UL (ref 0–1)
MONOCYTES NFR BLD AUTO: 4 %
NEUTROPHILS # BLD AUTO: 7.5 K/UL (ref 1.3–7.7)
NEUTROPHILS NFR BLD AUTO: 74 %
PLATELET # BLD AUTO: 298 K/UL (ref 150–450)
POTASSIUM SERPL-SCNC: 3.2 MMOL/L (ref 3.5–5.1)
SODIUM SERPL-SCNC: 138 MMOL/L (ref 137–145)
WBC # BLD AUTO: 10.2 K/UL (ref 3.8–10.6)

## 2021-08-08 RX ADMIN — CEFAZOLIN SCH: 330 INJECTION, POWDER, FOR SOLUTION INTRAMUSCULAR; INTRAVENOUS at 08:20

## 2021-08-08 RX ADMIN — POTASSIUM CHLORIDE SCH MEQ: 20 TABLET, EXTENDED RELEASE ORAL at 13:25

## 2021-08-08 RX ADMIN — POTASSIUM CHLORIDE SCH MEQ: 20 TABLET, EXTENDED RELEASE ORAL at 11:52

## 2021-08-08 RX ADMIN — FUROSEMIDE SCH MG: 10 INJECTION, SOLUTION INTRAMUSCULAR; INTRAVENOUS at 08:22

## 2021-08-08 RX ADMIN — METOPROLOL TARTRATE SCH: 25 TABLET, FILM COATED ORAL at 10:14

## 2021-08-08 RX ADMIN — LEVOTHYROXINE SODIUM SCH MCG: 88 TABLET ORAL at 05:56

## 2021-08-08 RX ADMIN — CEFAZOLIN SCH: 330 INJECTION, POWDER, FOR SOLUTION INTRAMUSCULAR; INTRAVENOUS at 14:41

## 2021-08-08 NOTE — XR
EXAMINATION TYPE: XR chest 2V

 

DATE OF EXAM: 8/8/2021

 

COMPARISON: 8/6/2021

 

INDICATION: Shortness of breath

 

TECHNIQUE:  Frontal and lateral views of the chest are obtained.

 

FINDINGS:  

The heart size is normal.  

The pulmonary vasculature is normal.

Minimal infiltrate along the bases may be present. Correlate for atelectasis and atypical pulmonary e
dinesh. Atypical pneumonia could be considered. Findings have improved over the interval. 

 

IMPRESSION:  

1. Minimal residual infiltrates improvement over the interval..

## 2021-08-08 NOTE — P.DS
<Titus Siddiqi - Last Filed: 08/08/21 14:28>





Providers


Expected date of discharge: 08/08/21


Hospital Course: 





Discharge Diagnosis:





Acute systolic congestive heart failure with mild to moderately impaired EF of 

40-45%





Elevated troponin level





Mitral valve regurgitation with mild prolapse





Hypokalemia





Paroxysmal atrial fibrillation on anticoagulation with Eliquis. 





Hypothyroidism





Transaminitis, improved, likely secondary to hepatic congestion resulting from 

acute systolic heart failure





Shoulder blade pain, ultrasound revealing cholelithiasis 





Hospital Course: 





Patient is a very pleasant 70-year-old female with a past medical history of 

hypothyroidism and recently diagnosed atrial fibrillation currently on 

anticoagulation with Eliquis.  She presented to the hospital on 8/6/21 with a 

chief complaint of shortness of breath.  Hx this bradycardia was completed 

showing interstitial pulmonary edema with small pleural effusions indicated for 

acute CHF.  EKG was completed revealing normal sinus rhythm at 74 bpm with non-

specific T-wave abnormalities, no ST elevation or depression noted showing no 

signs of acute ischemia.  Troponins were trended resulting at < 0.012, 0.161, 

and 0.271.  Patient started on diuresis and admitted for acute congestive heart 

failure and elevated troponin level under hospitalist medicine services with 

consultation to cardiology.  Echocardiogram completed showing a mild to 

moderately impaired EF between 40-45% with hypokinesis movement throughout 

anterior left ventricular wall, mild to moderate mitral regurgitation and mild 

pulmonary hypertension.  Patient underwent cardiac cath on 8/6/21 showing normal

coronary arteries with mitral valve prolapse with moderate mitral valve 

regurgitation and hypokinetic wall motion abnormalities of the anterobasal 

segment possibly secondary to myocarditis.  A NOEMI was completed on 8/7/21 

revealing moderate mitral valve regurgitation with evidence of mitral valve 

thickening and mild prolapse with preserved function of LV and mild enlargement 

of left atrium.  Cardiology recommending continuation of medical therapy and 

consulted cardiothoracic surgery at this time.  Cardiothoracic surgery evaluated

patient stating no indication for surgical intervention at this time 

recommending maximization of medical management with additional blood pressure 

medication to reduce afterload. Flecainide was discontinued, metoprolol was de

creased to 12.5 mg twice daily, and patient started on lisinopril 5 mg daily.  

Diuretic also added with Lasix 40 mg daily. Patient to otherwise continue 

anticoagulation with Eliquis. While hospitalized, pt also reported some left 

shoulder blade pain and an ultrasound of her gallbladder was ordered revealing 

cholelithiasis.  She was also found to have mild transaminitis with ALT of 147 

and AST of 100, this improved with repeat labs revealing ALT of 90 and AST of 

42.  No surgical intervention needed at this time, patient may follow up 

outpatient with general surgery for evaluation of possible elective 

cholecystectomy in the future.  Patient stable for discharge home with her 

family at this time.  Patient to follow up outpatient with her PCP over the next

1-3 days after discharge upon returning to [place] and with her 

cardiologist in the next 1-2 weeks as we discussed.  All discharge instructions 

were reviewed with patient and her family and all questions answered at this 

time.  Patient also given scripts for BMP and magnesium for close monitoring of 

electrolytes and renal function with diuresis.  Results to be sent to PCP for 

follow-up.





Physical examination:





Patient seen and examined at bedside.  Patient reports resolution of shortness 

of breath.  She currently denies experiencing any chest pain, palpitations, 

shortness of breath, or dyspnea with exertion.  During assessment patient 

successfully walked to and from bathroom on room air and upon returning SpO2 

90%. 





Vital signs reviewed and stable. 


General: Nontoxic, no distress and appears stated age.  


Derm: Skin warm and dry, normal coloration for ethnicity.


Head: Atraumatic, normocephalic and symmetric.  


Eyes: EOMs intact, no lid lag, and anicteric sclera


Mouth: no lip lesions, mucus membranes moist


Cardiovascular: regular rate and rhythm with normal S1S2, no murmur, gallop, or 

rub.  Positive posterior tibial pulses bilaterally, and cap refill < 2 seconds. 




Lungs: Respirations even, regular, and unlabored on room air. Lungs CTA 

bilaterally, no rhonchi, no rales, no wheezing, and no accessory muscle usage.  

SpO2 90% on room air.


Abdominal: soft, nontender to palpation, no guarding, no appreciable 

organomegaly


Ext: ROM intact. No gross muscle atrophy, no edema, no contractures


Neuro: Speech clear, face symmetrical and CN II-XII grossly intact with no noted

focal neuro deficits


Psych: Alert and oriented to person, place, time, and situation. Appropriate and

pleasant affect. 








A total of 45 minutes of time were spent preparing this complex discharge summ

perri.








Patient Condition at Discharge: Stable





Plan - Discharge Summary


Discharge Rx Participant: No


New Discharge Prescriptions: 


New


   lisinopriL [Zestril] 5 mg PO DAILY 30 Days #30 tab


   Furosemide [Lasix] 40 mg PO DAILY 30 Days #30 tab


   Metoprolol Tartrate [Lopressor] 12.5 mg PO BID 30 Days #60 tab





Continue


   Apixaban [Eliquis] 5 mg PO BID 30 Days #60 tab


   Levothyroxine Sodium [Synthroid] 88 mcg PO DAILY





Discontinued


   Flecainide [Tambocor] 50 mg PO Q12HR 90 Days #180 tab


   Metoprolol Tartrate [Lopressor] 25 mg PO BID


Discharge Medication List





Apixaban [Eliquis] 5 mg PO BID 30 Days #60 tab 07/28/21 [Rx]


Levothyroxine Sodium [Synthroid] 88 mcg PO DAILY 08/06/21 [History]


Furosemide [Lasix] 40 mg PO DAILY 30 Days #30 tab 08/08/21 [Rx]


Metoprolol Tartrate [Lopressor] 12.5 mg PO BID 30 Days #60 tab 08/08/21 [Rx]


lisinopriL [Zestril] 5 mg PO DAILY 30 Days #30 tab 08/08/21 [Rx]








Follow up Appointment(s)/Referral(s): 


None,Stated [Primary Care Provider] -  (Patient following up with her primary 

care provider and cardiologist in [place] as discussed.)


Ambulatory/Diagnostic Orders: 


Basic Metabolic Panel [LAB.AMB] Time Frame: 3 Days, Location: None Selected


Magnesium [LAB.AMB] Time Frame: 3 Days, Location: None Selected


Patient Instructions/Handouts:  *Surgery [facility] - After Heart Catheterization - 

Ambulatory Care Instructions, Heart Failure (DC)


Activity/Diet/Wound Care/Special Instructions: 





Activity:





As tolerated.  Take breaks as needed.





Diet: 


 


Heart healthy and carb consistent diet. Avoid salts, or foods with hidden salts 

such as canned or boxed foods and frozen dinners. Extra salt makes your heart 

work harder and traps the fluid in your body for longer.





Special Instructions:  





Weigh yourself every morning after you urinate. If you gain 3 pounds overnight 

or more than 5 pounds in one week, call your primary physician and cardiologist 

for guidance on your medications or they may want to see you in their office. 

Keep a daily log of your weights and be sure to bring with you at follow up 

visits with your PCP and cardiologist.





Take all of your medications as directed, especially your water pills. NEVER 

skip a dose.  And remember to keep all of your doctor's appointments and follow-

up as needed.





Elevate your legs when you are not up moving around to help with circulation and

 prevent swelling. Compression stockings are also a great way to improve lower 

extremity circulation and prevent/improve lower extremity edema. 





Call your primary care provider and cardiologist if you notice any extra 

swelling in your legs, ankles, feet or abdomen, if you have a new dry cough, if 

your shortness of breath worsens with activity or at rest, or if you feel more 

fatigued.





Thank you for allowing us to participate in your care, it was truly a pleasure 

having you for our patient!!! Have a safe drive back to [place]!!





As we discussed, great vitamin supplements to take would be potassium 10 mEq 

daily along with a daily multivitamin.  








Discharge Disposition: HOME SELF-CARE





<Aparna Banks - Last Filed: 08/08/21 17:08>





Providers


Date of admission: 


08/06/21 03:07





Attending physician: 


Evelyn Villaseñor DO





Consults: 





                                        





08/06/21 02:32


Consult Physician Routine 


   Consulting Provider: [facility]


   Consult Reason/Comments: CHF?; Pulmonary edema


   Do you want consulting provider notified?: Yes





08/08/21 10:04


Consult Physician Routine 


   Consulting Provider: Nakul Maza


   Consult Reason/Comments: Mitral Regurg


   Do you want consulting provider notified?: Yes











Primary care physician: 


Stated None





Hospital Course: 


Patient was seen and evaluated by me on the day of discharge.  She appeared 

euvolemic.  Lungs are clear to auscultation bilaterally.  No pitting edema 

around the ankles.  Patient would like to follow-up with cardiology in [place].  I explained to her and her family that she has nonischemic 

cardiomyopathy given her recent left heart catheterization showed no significant

 coronary artery disease.  Patient was advised to continue to monitor her weight

 daily and take medications as directed.

## 2021-08-08 NOTE — P.GSCN
History of Present Illness


Consult date: 08/08/21


Reason for Consult: 





mitral regurgitation


Requesting physician: Roseann Venegas


History of present illness: 





This is a 70 year old female who follows on an outpatient basis with physician 

assistant Elizabeth Coppola, who resides in the [facility] and who is here 

for vacation.  She has a previous medical history of hypothyroid, recent 

diagnosis of paroxysmal atrial flutter on Eliquis for anticoagulation, and no 

history of heart disease.  She presented to [facility] emergency room 

with complaints of shortness of breath with laying flat and with exertion, dry 

cough, lower extremity edema, and right shoulder discomfort for a few days.  

Chest x-ray demonstrated findings consistent with acute heart failure.  EKG 

showed NSR with nonspecific ST abnormalities. Initial troponin was negative, but

increased to 0.271.  She was admitted for evaluation and treatment with 

consultation to cardiology.  Workup included heart catheterization which 

demonstrated normal coronaries, LV gram showed mitral regurgitation.  

Transthoracic and transesophageal echocardiogram confirmed mitral regurgitation.

 Consultation was placed to Dr. Maza for surgical recommendations. 





Review of Systems





ROS was completed and was negative except as noted





- Cardiovascular


Reports as per HPI, Reports chest pain, Reports shortness of breath





Past Medical History


Past Medical History: Atrial Flutter, Heart Failure, Thyroid Disorder


Additional Past Medical History / Comment(s): mitral valve regurgitation


History of Any Multi-Drug Resistant Organisms: None Reported


Past Surgical History: Hysterectomy, Orthopedic Surgery


Additional Past Surgical History / Comment(s): hip surgery


Past Anesthesia/Blood Transfusion Reactions: Previous Problems w/ Anesthesia


Additional Past Anesthesia/Blood Transfusion Reaction / Comm: hard to wake up


Past Psychological History: No Psychological Hx Reported


Smoking Status: Never smoker


Past Alcohol Use History: Occasional


Past Drug Use History: None Reported





- Past Family History


  ** Father


Family Medical History: Cancer


Additional Family Medical History / Comment(s): lung cancer, ETOH





  ** Mother


Family Medical History: COPD





  ** Sister(s)


Family Medical History: Cancer, Hypertension





Medications and Allergies


                                Home Medications











 Medication  Instructions  Recorded  Confirmed  Type


 


Flecainide [Tambocor] 50 mg PO Q12HR 90 Days #180 tab 07/27/21 08/06/21 Rx


 


Apixaban [Eliquis] 5 mg PO BID 30 Days #60 tab 07/28/21 08/06/21 Rx


 


Levothyroxine Sodium [Synthroid] 88 mcg PO DAILY 08/06/21 08/06/21 History


 


Metoprolol Tartrate [Lopressor] 25 mg PO BID 08/06/21 08/06/21 History








                                    Allergies











Allergy/AdvReac Type Severity Reaction Status Date / Time


 


No Known Allergies Allergy   Verified 08/06/21 06:38














Surgical - Exam


                                   Vital Signs











Temp Pulse Resp BP Pulse Ox


 


 98 F   66   18   196/86   95 


 


 08/06/21 00:37  08/06/21 00:37  08/06/21 00:37  08/06/21 00:37  08/06/21 00:37














CONSTITUTIONAL: Awake and alert, appears comfortable, cooperative, well-

developed, well-nourished, no pain, no acute distress


EYES:  Pupils equal, round, reactive to light, normal ocular movement


ENT:  Moist mucous membranes without oral lesions present 


NECK:  No masses, no bruits, trachea midline


RESPIRATORY:  Lungs sounds diminished bilaterally with faint crackles in the 

bases.  Respirations even, nonlabored.  Currently on room air with oxygen 

saturation 90%, was previously on 2 LPM NC with oxygen saturation 97%.  Strong 

cough.  No chest wall deformities.  No clubbing or cyanosis present


CARDIOVASCULAR:  S1, S2 present.  Slow but regular rate and rhythm, sinus thais 

on telemetry.  Palpable peripheral pulses bilaterally.  Trace bilateral lower 

extremity edema present.  No calf pain or tenderness noted.  


GASTROINTESTINAL:  Abdomen soft, nontender, nondistended without masses or 

organomegaly noted.  There is no rebound or guarding present.  Active bowel 

sounds present 4 quadrants.  


GENITOURINARY:  Deferred


INTEGUMENTARY:  Skin is warm and dry with evidence of good perfusion.  


NEUROLOGIC:  Cranial nerves II through XII intact, normal coordination, no 

obvious motor or sensory deficits, speech is normal


MUSKULOSKELETAL:  Able to move all extremities, strength equal bilaterally, 

normal posture


PSYCHIATRIC:  Alert and oriented to person place and time, appropriate affect, 

intact judgment and insight








Results





- Labs





                                 08/08/21 10:53





                                 08/07/21 07:31





- Imaging


Chest x-ray: report reviewed, image reviewed


EKG: image reviewed


Additional studies: 





heart catheterization  and echo films reviewed by Dr. Maza





Assessment and Plan


Assessment: 





1.  Mitral regurgitation


2.  Acute heart failure


3.  Shortness of breath related to above


4.  History of typical atrial flutter, on Eliquis for anticoagulation, currently

 sinus thais


5.  Hypothyroid





Plan: 





The patient was seen and examined with Dr. Maza.  She has mild to moderate MR,

 mild AI, decreased LV function.  Based on the current studies there is no 

indication for surgical intervention.  Her heart failure needs to be maximized. 

 In addition, she was hypertensive on admission.  We recommend afterload reduct

ion.  NOEMI can be repeated in the future if heart failure symptoms persist 

despite maximized medical management.  This was discussed with the patient, her 

family, and Dr. Venegas.  





Thank you for this consult.  Please call us with any further questions.





Time with Patient: Greater than 30

## 2021-08-08 NOTE — P.PN
Subjective


Progress Note Date: 08/08/21





HISTORY OF PRESENT ILLNESS: 





This is a 70-year-old female who underwent cardiac catheterization yesterday 

with Dr. Venegas.  Cardiac cath revealed normal coronary arteries.  Mitral 

valve prolapse with moderate mitral regurgitation.  Wall motion abnormalities 

with hypokinesis of the anterior basal segment could be secondary to 

myocarditis.  Patient also underwent NOEMI revealing moderate mitral regurgitation

with evidence of mitral valve thickening and mild prolapse.  Mild tricuspid 

regurgitation no clot in the left atrial appendage.  No definite PFO.  LV 

function is preserved.  She denies chest pain or pressure.  Denies shortness of 

breath.  Vital signs are stable.  She is hoping to be discharged home today.





PHYSICAL EXAM: 


VITAL SIGNS: Reviewed.


GENERAL: Well-developed in no acute distress. 


NECK: Supple. No JVD or thyromegaly


LUNGS: Respirations even and unlabored. Lungs essentially clear to auscultation 

bilaterally.


HEART: Regular rate and rhythm.  S1 and S2 heard.


EXTREMITIES: Normal range of motion.  No clubbing or cyanosis.  Peripheral pu

lses intact.  No lower extremity edema





ASSESSMENT: 


Acute systolic congestive heart failure


Paroxysmal atrial fibrillation, and into quite elation with Eliquis


Moderate mitral regurgitation





PLAN: 


Transition patient over to oral Lasix.


Decrease beta blocker secondary to bradycardia


Resume Eliquis


Patient may be discharged home today pending evaluation by cardiothoracic 

surgery








Nurse practitioner note has been reviewed by physician. Signing provider agrees 

with the documented findings, assessment, and plan of care. 








Objective





- Vital Signs


Vital signs: 


                                   Vital Signs











Temp  98.0 F   08/08/21 11:29


 


Pulse  55 L  08/08/21 14:00


 


Resp  18   08/08/21 14:00


 


BP  120/68   08/08/21 11:29


 


Pulse Ox  90 L  08/08/21 11:29








                                 Intake & Output











 08/07/21 08/08/21 08/08/21





 18:59 06:59 18:59


 


Intake Total 499.989 96.127 240


 


Output Total 1450 850 200


 


Balance -950.011 -753.873 40


 


Weight  76.7 kg 


 


Intake:   


 


    


 


  Intake, IV Titration 159.989 96.127 





  Amount   


 


    Heparin Sod,Pork in 0.45% 159.989 96.127 





    NaCl 25,000 unit In 0.45   





    % NaCl 1 250ml.bag @ 12   





    UNITS/KG/HR 9.525 mls/hr   





    IV .Q24H OBEY Rx#:   





    [id number]   


 


  Oral 240  240


 


Output:   


 


  Urine 1450 850 200


 


Other:   


 


  Voiding Method Toilet Toilet Toilet


 


  # Voids 2  4


 


  # Bowel Movements   4














- Labs


CBC & Chem 7: 


                                 08/08/21 10:53





                                 08/08/21 10:53


Labs: 


                  Abnormal Lab Results - Last 24 Hours (Table)











  08/08/21 Range/Units





  10:53 


 


Potassium  3.2 L  (3.5-5.1)  mmol/L


 


Carbon Dioxide  31 H  (22-30)  mmol/L


 


BUN  20 H  (7-17)  mg/dL


 


Glucose  105 H  (74-99)  mg/dL

## 2021-08-08 NOTE — ECHOF
Referral Reason:per dr nieto



MEASUREMENTS

--------

HEIGHT: 160.0 cm

WEIGHT: 77.1 kg

BP: 

RVIDd:   2.6 cm     (< 3.3)

IVSd:   1.0 cm     (0.6 - 1.1)

LVIDd:   4.5 cm     (3.9 - 5.3)

LVPWd:   1.4 cm     (0.6 - 1.1)

IVSs:   1.5 cm

LVIDs:   3.4 cm

LVPWs:   1.5 cm

LA Diam:   3.3 cm     (2.7 - 3.8)

LAESV Index (A-L):   30.29 ml/m

Ao Diam:   2.8 cm     (2.0 - 3.7)

AV Cusp:   1.7 cm     (1.5 - 2.6)

LA Diam:   3.9 cm     (2.7 - 3.8)

MV EXCURSION:   21.518 mm     (> 18.000)

MV EF SLOPE:   118 mm/s     (70 - 150)

EPSS:   0.8 cm

MV E Santos:   0.55 m/s

MV DecT:   126 ms

MV A Santos:   0.59 m/s

MV E/A Ratio:   0.93 

RAP:   5.00 mmHg

RVSP:   25.78 mmHg







FINDINGS

--------

Sinus rhythm.

This was a technically good study.

LV size, wall thickness and systolic function are normal, with an EF greater than 55%.   The left dulce
tricular size is normal.

The right ventricle is normal in size.

LA is midly dilated 29-33ml/m2.

The right atrial size is normal.

There is mild aortic valve sclerosis.   Trace to mild aortic regurgitation.

Mild mitral annular calcification present.   Mild mitral regurgitation is present.

Mild tricuspid regurgitation present.   Right ventricular systolic pressure is normal at < 35 mmHg.

There is no pulmonic regurgitation present.

There is no pericardial effusion.



CONCLUSIONS

--------

1. LV size, wall thickness and systolic function are normal, with an EF greater than 55%.

2. The left ventricular size is normal.

3. The right ventricle is normal in size.

4. LA is midly dilated 29-33ml/m2.

5. The right atrial size is normal.

6. There is mild aortic valve sclerosis.

7. Trace to mild aortic regurgitation.

8. Mild mitral annular calcification present.

9. Mild mitral regurgitation is present.

10. Mild tricuspid regurgitation present.

11. There is no pulmonic regurgitation present.

12. There is no pericardial effusion.





SONOGRAPHER: Nahomy Portillo RDCS